# Patient Record
Sex: MALE | Race: WHITE | Employment: OTHER | URBAN - NONMETROPOLITAN AREA
[De-identification: names, ages, dates, MRNs, and addresses within clinical notes are randomized per-mention and may not be internally consistent; named-entity substitution may affect disease eponyms.]

---

## 2017-01-12 RX ORDER — AZITHROMYCIN 250 MG/1
TABLET, FILM COATED ORAL
Qty: 1 PACKET | Refills: 0 | OUTPATIENT
Start: 2017-01-12

## 2017-04-17 RX ORDER — VALSARTAN 160 MG/1
TABLET ORAL
Qty: 30 TABLET | Refills: 2 | Status: SHIPPED | OUTPATIENT
Start: 2017-04-17 | End: 2017-06-19 | Stop reason: ALTCHOICE

## 2017-06-19 ENCOUNTER — OFFICE VISIT (OUTPATIENT)
Dept: PRIMARY CARE CLINIC | Age: 67
End: 2017-06-19
Payer: MEDICARE

## 2017-06-19 VITALS
OXYGEN SATURATION: 97 % | SYSTOLIC BLOOD PRESSURE: 136 MMHG | HEIGHT: 66 IN | WEIGHT: 232 LBS | BODY MASS INDEX: 37.28 KG/M2 | RESPIRATION RATE: 22 BRPM | DIASTOLIC BLOOD PRESSURE: 68 MMHG | HEART RATE: 85 BPM

## 2017-06-19 DIAGNOSIS — I10 ESSENTIAL HYPERTENSION: Primary | ICD-10-CM

## 2017-06-19 DIAGNOSIS — I10 ESSENTIAL HYPERTENSION: ICD-10-CM

## 2017-06-19 LAB
ALBUMIN SERPL-MCNC: 4.6 G/DL (ref 3.5–5.2)
ALP BLD-CCNC: 51 U/L (ref 40–130)
ALT SERPL-CCNC: 17 U/L (ref 5–41)
ANION GAP SERPL CALCULATED.3IONS-SCNC: 19 MMOL/L (ref 7–19)
AST SERPL-CCNC: 21 U/L (ref 5–40)
BILIRUB SERPL-MCNC: 0.4 MG/DL (ref 0.2–1.2)
BILIRUBIN URINE: NEGATIVE
BLOOD, URINE: NEGATIVE
BUN BLDV-MCNC: 13 MG/DL (ref 8–23)
CALCIUM SERPL-MCNC: 9.6 MG/DL (ref 8.8–10.2)
CHLORIDE BLD-SCNC: 100 MMOL/L (ref 98–111)
CLARITY: CLEAR
CO2: 23 MMOL/L (ref 22–29)
COLOR: YELLOW
CREAT SERPL-MCNC: 0.8 MG/DL (ref 0.5–1.2)
CREATININE URINE: 39 MG/DL (ref 4.2–622)
GFR NON-AFRICAN AMERICAN: >60
GLUCOSE BLD-MCNC: 117 MG/DL (ref 74–109)
GLUCOSE URINE: NEGATIVE MG/DL
HBA1C MFR BLD: 5.7 %
HCT VFR BLD CALC: 46.5 % (ref 42–52)
HEMOGLOBIN: 16 G/DL (ref 14–18)
KETONES, URINE: NEGATIVE MG/DL
LEUKOCYTE ESTERASE, URINE: NEGATIVE
MCH RBC QN AUTO: 31 PG (ref 27–31)
MCHC RBC AUTO-ENTMCNC: 34.4 G/DL (ref 33–37)
MCV RBC AUTO: 90.1 FL (ref 80–94)
NITRITE, URINE: NEGATIVE
PDW BLD-RTO: 13 % (ref 11.5–14.5)
PH UA: 5
PLATELET # BLD: 220 K/UL (ref 130–400)
PMV BLD AUTO: 11.9 FL (ref 9.4–12.4)
POTASSIUM SERPL-SCNC: 3.9 MMOL/L (ref 3.5–5)
PROTEIN PROTEIN: 5 MG/DL (ref 15–45)
PROTEIN UA: NEGATIVE MG/DL
RBC # BLD: 5.16 M/UL (ref 4.7–6.1)
SODIUM BLD-SCNC: 142 MMOL/L (ref 136–145)
SPECIFIC GRAVITY UA: 1.01
TOTAL PROTEIN: 7.7 G/DL (ref 6.6–8.7)
TSH SERPL DL<=0.05 MIU/L-ACNC: 1.91 UIU/ML (ref 0.27–4.2)
UROBILINOGEN, URINE: 0.2 E.U./DL
WBC # BLD: 7.7 K/UL (ref 4.8–10.8)

## 2017-06-19 PROCEDURE — G8419 CALC BMI OUT NRM PARAM NOF/U: HCPCS | Performed by: INTERNAL MEDICINE

## 2017-06-19 PROCEDURE — 99214 OFFICE O/P EST MOD 30 MIN: CPT | Performed by: INTERNAL MEDICINE

## 2017-06-19 PROCEDURE — 3017F COLORECTAL CA SCREEN DOC REV: CPT | Performed by: INTERNAL MEDICINE

## 2017-06-19 PROCEDURE — 4040F PNEUMOC VAC/ADMIN/RCVD: CPT | Performed by: INTERNAL MEDICINE

## 2017-06-19 PROCEDURE — 1036F TOBACCO NON-USER: CPT | Performed by: INTERNAL MEDICINE

## 2017-06-19 PROCEDURE — G8427 DOCREV CUR MEDS BY ELIG CLIN: HCPCS | Performed by: INTERNAL MEDICINE

## 2017-06-19 PROCEDURE — 1123F ACP DISCUSS/DSCN MKR DOCD: CPT | Performed by: INTERNAL MEDICINE

## 2017-06-19 RX ORDER — VALSARTAN AND HYDROCHLOROTHIAZIDE 160; 25 MG/1; MG/1
1 TABLET ORAL DAILY
Qty: 30 TABLET | Refills: 3 | Status: SHIPPED | OUTPATIENT
Start: 2017-06-19 | End: 2018-01-16 | Stop reason: SDUPTHER

## 2017-06-19 RX ORDER — HYDROCHLOROTHIAZIDE 25 MG/1
25 TABLET ORAL DAILY
Qty: 90 TABLET | Refills: 3 | Status: CANCELLED | OUTPATIENT
Start: 2017-06-19

## 2017-06-19 ASSESSMENT — ENCOUNTER SYMPTOMS
DIARRHEA: 0
SHORTNESS OF BREATH: 0
VOMITING: 0
NAUSEA: 0
BACK PAIN: 0
CONSTIPATION: 0

## 2018-01-16 RX ORDER — VALSARTAN AND HYDROCHLOROTHIAZIDE 160; 25 MG/1; MG/1
1 TABLET ORAL DAILY
Qty: 30 TABLET | Refills: 5 | Status: SHIPPED | OUTPATIENT
Start: 2018-01-16 | End: 2018-09-21

## 2018-01-16 NOTE — TELEPHONE ENCOUNTER
From: Vikram Almaraz. Sent: 1/16/2018 11:25 AM CST  Subject: Medication Renewal Request    Matthew Alexis. would like a refill of the following medications:  valsartan-hydrochlorothiazide (DIOVAN-HCT) 160-25 MG per tablet Ranchela Croft, DO]    Preferred pharmacy: [facility] [address], [address] [place] [address]    Comment:  Greetings from [facility]. ..current temp 6. ..yelp 6... Viji Timmons at [facility] has informed me I do not have anymore refills on this script. Please renew this script. Mana Ambrosio Mana Ambrosio I have enough for (9) nine days. Thank you! Romayne Ensign.

## 2018-01-22 ENCOUNTER — OFFICE VISIT (OUTPATIENT)
Dept: PRIMARY CARE CLINIC | Age: 68
End: 2018-01-22
Payer: MEDICARE

## 2018-01-22 VITALS
HEART RATE: 89 BPM | DIASTOLIC BLOOD PRESSURE: 70 MMHG | BODY MASS INDEX: 37.48 KG/M2 | HEIGHT: 66 IN | WEIGHT: 233.2 LBS | SYSTOLIC BLOOD PRESSURE: 136 MMHG | TEMPERATURE: 97 F | OXYGEN SATURATION: 97 %

## 2018-01-22 DIAGNOSIS — J01.10 ACUTE NON-RECURRENT FRONTAL SINUSITIS: Primary | ICD-10-CM

## 2018-01-22 PROCEDURE — G8484 FLU IMMUNIZE NO ADMIN: HCPCS | Performed by: INTERNAL MEDICINE

## 2018-01-22 PROCEDURE — G8419 CALC BMI OUT NRM PARAM NOF/U: HCPCS | Performed by: INTERNAL MEDICINE

## 2018-01-22 PROCEDURE — 99213 OFFICE O/P EST LOW 20 MIN: CPT | Performed by: INTERNAL MEDICINE

## 2018-01-22 PROCEDURE — 96372 THER/PROPH/DIAG INJ SC/IM: CPT | Performed by: INTERNAL MEDICINE

## 2018-01-22 PROCEDURE — 1123F ACP DISCUSS/DSCN MKR DOCD: CPT | Performed by: INTERNAL MEDICINE

## 2018-01-22 PROCEDURE — 4040F PNEUMOC VAC/ADMIN/RCVD: CPT | Performed by: INTERNAL MEDICINE

## 2018-01-22 PROCEDURE — 3017F COLORECTAL CA SCREEN DOC REV: CPT | Performed by: INTERNAL MEDICINE

## 2018-01-22 PROCEDURE — 1036F TOBACCO NON-USER: CPT | Performed by: INTERNAL MEDICINE

## 2018-01-22 PROCEDURE — G8427 DOCREV CUR MEDS BY ELIG CLIN: HCPCS | Performed by: INTERNAL MEDICINE

## 2018-01-22 RX ORDER — AZITHROMYCIN 250 MG/1
TABLET, FILM COATED ORAL
Qty: 1 PACKET | Refills: 0 | Status: SHIPPED | OUTPATIENT
Start: 2018-01-22 | End: 2018-02-01

## 2018-01-22 RX ORDER — KETOROLAC TROMETHAMINE 30 MG/ML
30 INJECTION, SOLUTION INTRAMUSCULAR; INTRAVENOUS ONCE
Qty: 1 ML | Refills: 0
Start: 2018-01-22 | End: 2020-09-16 | Stop reason: ALTCHOICE

## 2018-01-22 RX ORDER — TRIAMCINOLONE ACETONIDE 40 MG/ML
40 INJECTION, SUSPENSION INTRA-ARTICULAR; INTRAMUSCULAR ONCE
Status: COMPLETED | OUTPATIENT
Start: 2018-01-22 | End: 2018-01-22

## 2018-01-22 RX ORDER — MONTELUKAST SODIUM 10 MG/1
10 TABLET ORAL NIGHTLY
Qty: 15 TABLET | Refills: 0 | Status: SHIPPED | OUTPATIENT
Start: 2018-01-22 | End: 2018-02-15 | Stop reason: SDUPTHER

## 2018-01-22 RX ADMIN — TRIAMCINOLONE ACETONIDE 40 MG: 40 INJECTION, SUSPENSION INTRA-ARTICULAR; INTRAMUSCULAR at 11:31

## 2018-01-22 ASSESSMENT — ENCOUNTER SYMPTOMS
NAUSEA: 0
SINUS PAIN: 1
VOMITING: 0
CONSTIPATION: 0
RHINORRHEA: 1
DIARRHEA: 0
SHORTNESS OF BREATH: 0
BACK PAIN: 0
SINUS PRESSURE: 1

## 2018-01-22 NOTE — PROGRESS NOTES
After obtaining consent, and per orders of Dr. Niko Rodriguez, injection of Kenalog and Toradol given in Right and Left upper quad. gluteus by Karley Watkins. Patient tolerated the injection well.

## 2018-01-31 ENCOUNTER — TELEPHONE (OUTPATIENT)
Dept: PRIMARY CARE CLINIC | Age: 68
End: 2018-01-31

## 2018-02-15 RX ORDER — MONTELUKAST SODIUM 10 MG/1
10 TABLET ORAL NIGHTLY
Qty: 15 TABLET | Refills: 0 | Status: SHIPPED | OUTPATIENT
Start: 2018-02-15 | End: 2018-04-16 | Stop reason: SDUPTHER

## 2018-02-22 RX ORDER — AZITHROMYCIN 250 MG/1
TABLET, FILM COATED ORAL
Qty: 1 PACKET | Refills: 0 | Status: SHIPPED | OUTPATIENT
Start: 2018-02-22 | End: 2018-11-19 | Stop reason: SDUPTHER

## 2018-04-16 ENCOUNTER — TELEPHONE (OUTPATIENT)
Dept: PRIMARY CARE CLINIC | Age: 68
End: 2018-04-16

## 2018-04-16 RX ORDER — MONTELUKAST SODIUM 10 MG/1
10 TABLET ORAL NIGHTLY
Qty: 15 TABLET | Refills: 0 | Status: SHIPPED | OUTPATIENT
Start: 2018-04-16 | End: 2020-09-16 | Stop reason: ALTCHOICE

## 2018-08-13 ENCOUNTER — OFFICE VISIT (OUTPATIENT)
Dept: PRIMARY CARE CLINIC | Age: 68
End: 2018-08-13
Payer: MEDICARE

## 2018-08-13 VITALS
BODY MASS INDEX: 37.61 KG/M2 | HEART RATE: 82 BPM | WEIGHT: 234 LBS | HEIGHT: 66 IN | OXYGEN SATURATION: 100 % | SYSTOLIC BLOOD PRESSURE: 132 MMHG | DIASTOLIC BLOOD PRESSURE: 60 MMHG | TEMPERATURE: 98.6 F

## 2018-08-13 DIAGNOSIS — Z13.1 SCREENING FOR DIABETES MELLITUS (DM): ICD-10-CM

## 2018-08-13 DIAGNOSIS — Z23 NEED FOR VACCINE FOR DT (DIPHTHERIA-TETANUS): ICD-10-CM

## 2018-08-13 DIAGNOSIS — Z13.220 SCREENING FOR CHOLESTEROL LEVEL: ICD-10-CM

## 2018-08-13 DIAGNOSIS — Z00.00 ROUTINE GENERAL MEDICAL EXAMINATION AT A HEALTH CARE FACILITY: Primary | ICD-10-CM

## 2018-08-13 DIAGNOSIS — J30.2 SEASONAL ALLERGIES: ICD-10-CM

## 2018-08-13 LAB — HBA1C MFR BLD: 5.8 %

## 2018-08-13 PROCEDURE — 4040F PNEUMOC VAC/ADMIN/RCVD: CPT | Performed by: NURSE PRACTITIONER

## 2018-08-13 PROCEDURE — G0438 PPPS, INITIAL VISIT: HCPCS | Performed by: NURSE PRACTITIONER

## 2018-08-13 PROCEDURE — 90715 TDAP VACCINE 7 YRS/> IM: CPT | Performed by: NURSE PRACTITIONER

## 2018-08-13 PROCEDURE — 90471 IMMUNIZATION ADMIN: CPT | Performed by: NURSE PRACTITIONER

## 2018-08-13 ASSESSMENT — PATIENT HEALTH QUESTIONNAIRE - PHQ9
SUM OF ALL RESPONSES TO PHQ QUESTIONS 1-9: 0
SUM OF ALL RESPONSES TO PHQ QUESTIONS 1-9: 0

## 2018-08-13 ASSESSMENT — ANXIETY QUESTIONNAIRES: GAD7 TOTAL SCORE: 0

## 2018-08-13 ASSESSMENT — LIFESTYLE VARIABLES
HOW MANY STANDARD DRINKS CONTAINING ALCOHOL DO YOU HAVE ON A TYPICAL DAY: 0
HOW OFTEN DO YOU HAVE A DRINK CONTAINING ALCOHOL: 4
HOW OFTEN DURING THE LAST YEAR HAVE YOU NEEDED AN ALCOHOLIC DRINK FIRST THING IN THE MORNING TO GET YOURSELF GOING AFTER A NIGHT OF HEAVY DRINKING: 0
AUDIT-C TOTAL SCORE: 4
HOW OFTEN DURING THE LAST YEAR HAVE YOU HAD A FEELING OF GUILT OR REMORSE AFTER DRINKING: 0
HOW OFTEN DURING THE LAST YEAR HAVE YOU FAILED TO DO WHAT WAS NORMALLY EXPECTED FROM YOU BECAUSE OF DRINKING: 0
HOW OFTEN DO YOU HAVE SIX OR MORE DRINKS ON ONE OCCASION: 0
HAVE YOU OR SOMEONE ELSE BEEN INJURED AS A RESULT OF YOUR DRINKING: 0
HOW OFTEN DURING THE LAST YEAR HAVE YOU BEEN UNABLE TO REMEMBER WHAT HAPPENED THE NIGHT BEFORE BECAUSE YOU HAD BEEN DRINKING: 0
HAS A RELATIVE, FRIEND, DOCTOR, OR ANOTHER HEALTH PROFESSIONAL EXPRESSED CONCERN ABOUT YOUR DRINKING OR SUGGESTED YOU CUT DOWN: 0
AUDIT TOTAL SCORE: 4
HOW OFTEN DURING THE LAST YEAR HAVE YOU FOUND THAT YOU WERE NOT ABLE TO STOP DRINKING ONCE YOU HAD STARTED: 0

## 2018-08-13 NOTE — PROGRESS NOTES
Medicare Annual Wellness Visit  Name: Particio CREWS Date: 2018   MRN: [id number] Sex: Male   Age: 80 y.o. Ethnicity: Non-/Non    : 1950 Race: Alfredo Chun. is here for No chief complaint on file. Screenings for behavioral, psychosocial and functional/safety risks, and cognitive dysfunction are all negative except as indicated below. These results, as well as other patient data from the [address] form, are documented in Flowsheets linked to this Encounter. Allergies   Allergen Reactions    Levofloxacin     Pcn [Penicillins]     Sulfa Antibiotics        Prior to Visit Medications    Medication Sig Taking?  Authorizing Provider   montelukast (SINGULAIR) 10 MG tablet Take 1 tablet by mouth nightly for 15 days  Troy Coulter,    ketorolac (TORADOL) 30 MG/ML injection Inject 1 mL into the muscle once for 1 dose  Troy Coulter DO   valsartan-hydrochlorothiazide (DIOVAN-HCT) 160-25 MG per tablet Take 1 tablet by mouth daily  Troy Coulter DO   Ascorbic Acid (VITAMIN C) 500 MG tablet Take 500 mg by mouth daily  Historical Provider, MD   aspirin 325 MG tablet Take 325 mg by mouth daily  Historical Provider, MD   Cholecalciferol (VITAMIN D PO) Take by mouth  Historical Provider, MD   vitamin E 1000 UNITS capsule Take 1,000 Units by mouth daily  Historical Provider, MD       Past Medical History:   Diagnosis Date    Atrial fibrillation ([address]     Diverticulitis     HTN (hypertension)     Unspecified sleep apnea     CPAP    Wears glasses      Past Surgical History:   Procedure Laterality Date    COLON SURGERY      perf-Dr QUEZADA    OTHER SURGICAL HISTORY  13    heart mapping & electrophysiologic ablation    OTHER SURGICAL HISTORY  5/11/15    colostomy takedown       Family History   Problem Relation Age of Onset    Heart Failure Father     Cancer Father         colon    Heart Disease Father         CHF    Diabetes

## 2018-08-13 NOTE — PROGRESS NOTES
deficit, gait, coordination and speech normal    Patient's complete Health Risk Assessment and screening values have been reviewed and are found in Flowsheets. The following problems were reviewed today and where indicated follow up appointments were made and/or referrals ordered. Positive Risk Factor Screenings with Interventions:     General Health:  General  In general, how would you say your health is?: Excellent  In the past 7 days, have you experienced any of the following?: None of These  Do you get the social and emotional support that you need?: Yes  Do you have a Living Will?: (!) No  General Health Risk Interventions:  · None indicated    Health Habits/Nutrition:  Health Habits/Nutrition  Do you exercise for at least 20 minutes 2-3 times per week?: Yes  Have you lost any weight without trying in the past 3 months?: No  Do you eat fewer than 2 meals per day?: No  Have you seen a dentist within the past year?: Yes  Body mass index is 37.77 kg/m². Health Habits/Nutrition Interventions:  · None indicated    Safety:  Safety  Do you have working smoke detectors?: Yes  Have all throw rugs been removed or fastened?: Yes  Do you have non-slip mats in all bathtubs?: (!) No  Do all of your stairways have a railing or banister?: Yes  Are your doorways, halls and stairs free of clutter?: Yes  Do you always fasten your seatbelt when you are in a car?: (!) No  Safety Interventions:  · None indicated    Personalized Preventive Plan   Current Health Maintenance Status    There is no immunization history on file for this patient.      Health Maintenance   Topic Date Due    AAA screen  1950    Hepatitis C screen  1950    DTaP/Tdap/Td vaccine (1 - Tdap) 07/31/1970    Lipid screen  07/31/1991    Shingles Vaccine (1 of 2 - 2 Dose Series) 07/31/2001    Colon cancer screen colonoscopy  07/31/2001    Pneumococcal low/med risk (1 of 2 - PCV13) 07/31/2016    A1C test (Diabetic or Prediabetic)  06/19/2018    Potassium monitoring  06/19/2018    Creatinine monitoring  06/19/2018    Flu vaccine (1) 09/01/2018     Recommendations for Preventive Services Due: see orders and patient instructions/AVS.  .   Recommended screening schedule for the next 5-10 years is provided to the patient in written form: see Patient Instructions/AVS.

## 2018-08-17 ASSESSMENT — PATIENT HEALTH QUESTIONNAIRE - PHQ9: SUM OF ALL RESPONSES TO PHQ QUESTIONS 1-9: 0

## 2018-08-27 ASSESSMENT — PATIENT HEALTH QUESTIONNAIRE - PHQ9: SUM OF ALL RESPONSES TO PHQ QUESTIONS 1-9: 0

## 2018-09-21 RX ORDER — LOSARTAN POTASSIUM AND HYDROCHLOROTHIAZIDE 25; 100 MG/1; MG/1
1 TABLET ORAL DAILY
Qty: 30 TABLET | Refills: 3 | Status: SHIPPED | OUTPATIENT
Start: 2018-09-21 | End: 2019-09-03 | Stop reason: SDUPTHER

## 2018-11-19 RX ORDER — AZITHROMYCIN 250 MG/1
TABLET, FILM COATED ORAL
Qty: 1 PACKET | Refills: 0 | Status: SHIPPED | OUTPATIENT
Start: 2018-11-19 | End: 2018-11-29

## 2018-11-19 NOTE — TELEPHONE ENCOUNTER
Patient called,request refill zpac \"it's my twice a year bronchitis-unable to come in for appt.  escribed

## 2019-01-15 ENCOUNTER — TELEPHONE (OUTPATIENT)
Dept: PRIMARY CARE CLINIC | Age: 69
End: 2019-01-15

## 2019-01-16 RX ORDER — AZITHROMYCIN 250 MG/1
TABLET, FILM COATED ORAL
Qty: 1 PACKET | Refills: 0 | Status: SHIPPED | OUTPATIENT
Start: 2019-01-16 | End: 2019-01-26

## 2019-09-04 ENCOUNTER — TELEPHONE (OUTPATIENT)
Dept: PRIMARY CARE CLINIC | Age: 69
End: 2019-09-04

## 2019-09-04 DIAGNOSIS — I10 HYPERTENSION, UNSPECIFIED TYPE: Primary | ICD-10-CM

## 2019-09-04 RX ORDER — LOSARTAN POTASSIUM AND HYDROCHLOROTHIAZIDE 25; 100 MG/1; MG/1
1 TABLET ORAL DAILY
Qty: 90 TABLET | Refills: 5 | Status: SHIPPED | OUTPATIENT
Start: 2019-09-04 | End: 2020-11-30

## 2019-09-04 NOTE — TELEPHONE ENCOUNTER
Pt would like his Losartan to be 90 prescription rather than 30 due to it being cheaper. He goes to [facility].

## 2020-09-16 ENCOUNTER — OFFICE VISIT (OUTPATIENT)
Dept: PRIMARY CARE CLINIC | Age: 70
End: 2020-09-16
Payer: MEDICARE

## 2020-09-16 VITALS
OXYGEN SATURATION: 98 % | DIASTOLIC BLOOD PRESSURE: 92 MMHG | TEMPERATURE: 97.8 F | HEIGHT: 66 IN | SYSTOLIC BLOOD PRESSURE: 160 MMHG | HEART RATE: 87 BPM | WEIGHT: 241 LBS | BODY MASS INDEX: 38.73 KG/M2

## 2020-09-16 LAB
APPEARANCE FLUID: CLEAR
BILIRUBIN, POC: NORMAL
BLOOD URINE, POC: NORMAL
CLARITY, POC: CLEAR
COLOR, POC: YELLOW
GLUCOSE URINE, POC: NORMAL
KETONES, POC: NORMAL
LEUKOCYTE EST, POC: NORMAL
NITRITE, POC: NORMAL
PH, POC: 5
PROTEIN, POC: NORMAL
SPECIFIC GRAVITY, POC: 1.03
UROBILINOGEN, POC: 0.2

## 2020-09-16 PROCEDURE — G8417 CALC BMI ABV UP PARAM F/U: HCPCS | Performed by: NURSE PRACTITIONER

## 2020-09-16 PROCEDURE — 4004F PT TOBACCO SCREEN RCVD TLK: CPT | Performed by: NURSE PRACTITIONER

## 2020-09-16 PROCEDURE — 99214 OFFICE O/P EST MOD 30 MIN: CPT | Performed by: NURSE PRACTITIONER

## 2020-09-16 PROCEDURE — 3017F COLORECTAL CA SCREEN DOC REV: CPT | Performed by: NURSE PRACTITIONER

## 2020-09-16 PROCEDURE — 4040F PNEUMOC VAC/ADMIN/RCVD: CPT | Performed by: NURSE PRACTITIONER

## 2020-09-16 PROCEDURE — G8427 DOCREV CUR MEDS BY ELIG CLIN: HCPCS | Performed by: NURSE PRACTITIONER

## 2020-09-16 PROCEDURE — 1123F ACP DISCUSS/DSCN MKR DOCD: CPT | Performed by: NURSE PRACTITIONER

## 2020-09-16 PROCEDURE — 81002 URINALYSIS NONAUTO W/O SCOPE: CPT | Performed by: NURSE PRACTITIONER

## 2020-09-16 ASSESSMENT — PATIENT HEALTH QUESTIONNAIRE - PHQ9
SUM OF ALL RESPONSES TO PHQ QUESTIONS 1-9: 0
SUM OF ALL RESPONSES TO PHQ9 QUESTIONS 1 & 2: 0
2. FEELING DOWN, DEPRESSED OR HOPELESS: 0
SUM OF ALL RESPONSES TO PHQ QUESTIONS 1-9: 0
1. LITTLE INTEREST OR PLEASURE IN DOING THINGS: 0

## 2020-09-16 ASSESSMENT — ENCOUNTER SYMPTOMS
COLOR CHANGE: 0
VOICE CHANGE: 0
SHORTNESS OF BREATH: 0
PHOTOPHOBIA: 0
BACK PAIN: 0
COUGH: 0
NAUSEA: 0
RHINORRHEA: 0
VOMITING: 0

## 2020-09-16 NOTE — PROGRESS NOTES
[address] [facility]  [id number] [place] [address]  [address] [address]  Dept: [phone]  Dept Fax: [phone]  Loc: [phone]    Major Connolly is a 72 y.o. male who presents today for his medical conditions/complaints as noted below. Major Connolly is c/o of Other (patient complaints of brown semen)      HPI:     HPI   Chief Complaint   Patient presents with    Other     patient complaints of brown semen     Patient presents today for evaluation of brown semen. Patient states that recently he and his wife have noticed this and it is concerning. He denies pain with urination, frequency or pain with intercourse or ejaculation. He does report a fullness feeling in his pelvic region; he attributes this to \"gas\" he states. He states his brother had prostate cancer.      Past Medical History:   Diagnosis Date    Atrial fibrillation ([address]     Diverticulitis     HTN (hypertension)     Unspecified sleep apnea     CPAP    Wears glasses       Past Surgical History:   Procedure Laterality Date    COLON SURGERY  2014    [facility]-Dr QUEZADA    OTHER SURGICAL HISTORY  5/27/13    heart mapping & electrophysiologic ablation    OTHER SURGICAL HISTORY  5/11/15    colostomy takedown    SUBTOTAL COLECTOMY         Vitals 9/16/2020 8/13/2018 1/22/2018 6/19/2017 5/10/2016 3/96/2198   SYSTOLIC 111 002 142 260 373 696   DIASTOLIC 92 60 70 68 70 78   Site - - Right Arm - - -   Position - - Sitting - - -   Pulse 87 82 89 85 80 83   Temp 97.8 98.6 97 - 97.8 98   Resp - - - 22 - -   SpO2 98 100 97 97 - -   Weight 241 lb 234 lb 233 lb 3.2 oz 232 lb 233 lb 9.6 oz 231 lb 6.4 oz   Height 5' 6\" 5' 6\" 5' 6\" 5' 6\" 5' 6\" 5' 6\"   BMI (wt*703/ht~2) 38.89 kg/m2 37.76 kg/m2 37.64 kg/m2 37.44 kg/m2 37.7 kg/m2 37.34 kg/m2       Family History   Problem Relation Age of Onset    Heart Failure Father     Cancer Father         colon    Heart Disease Father         CHF    Diabetes Mother     Heart Disease Maternal Grandmother     Heart Disease Maternal Grandfather        Social History     Tobacco Use    Smoking status: Former Smoker     Packs/day: 0.25     Years: 5.00     Pack years: 1.25     Start date:      Last attempt to quit:      Years since quittin.7    Smokeless tobacco: Never Used    Tobacco comment: Self Employed    Substance Use Topics    Alcohol use: Yes     Alcohol/week: 1.0 standard drinks     Types: 1 Glasses of wine per week     Comment: glass of Merlot per day      Current Outpatient Medications   Medication Sig Dispense Refill    losartan-hydrochlorothiazide (HYZAAR) 100-25 MG per tablet Take 1 tablet by mouth daily 90 tablet 5    Ascorbic Acid (VITAMIN C) 500 MG tablet Take 500 mg by mouth daily      aspirin 325 MG tablet Take 325 mg by mouth daily      Cholecalciferol (VITAMIN D PO) Take by mouth      vitamin E 1000 UNITS capsule Take 1,000 Units by mouth daily       No current facility-administered medications for this visit. Allergies   Allergen Reactions    Levofloxacin     Pcn [Penicillins]     Sulfa Antibiotics        Health Maintenance   Topic Date Due    AAA screen  1950    Hepatitis C screen  1950    Lipid screen  1991    Shingles Vaccine (1 of 2) 2001    Colon cancer screen colonoscopy  2001    Pneumococcal 65+ years Vaccine (1 of 1 - PPSV23) 2016    Potassium monitoring  2018    Creatinine monitoring  2018    Annual Wellness Visit (AWV)  2019    A1C test (Diabetic or Prediabetic)  2019    Flu vaccine (1) 2020    DTaP/Tdap/Td vaccine (2 - Td) 2028    Hepatitis A vaccine  Aged Out    Hepatitis B vaccine  Aged Out    Hib vaccine  Aged Out    Meningococcal (ACWY) vaccine  Aged Out       Subjective:      Review of Systems   Constitutional: Negative for chills and fever. HENT: Negative for ear pain, hearing loss, rhinorrhea and voice change.     Eyes: Negative for photophobia and visual disturbance. Respiratory: Negative for cough and shortness of breath. Cardiovascular: Negative for chest pain and palpitations. Gastrointestinal: Negative for nausea and vomiting. Endocrine: Negative. Negative for cold intolerance and heat intolerance. Genitourinary: Negative for difficulty urinating and flank pain. Musculoskeletal: Negative for back pain and neck pain. Skin: Negative for color change and rash. Allergic/Immunologic: Negative for environmental allergies and food allergies. Neurological: Negative for dizziness, speech difficulty and headaches. Hematological: Does not bruise/bleed easily. Psychiatric/Behavioral: Negative for sleep disturbance and suicidal ideas. Objective:     Physical Exam  Vitals signs and nursing note reviewed. Constitutional:       Appearance: He is well-developed. HENT:      Head: Atraumatic. Right Ear: External ear normal.      Left Ear: External ear normal.      Nose: Nose normal.   Eyes:      Conjunctiva/sclera: Conjunctivae normal.      Pupils: Pupils are equal, round, and reactive to light. Neck:      Musculoskeletal: Normal range of motion and neck supple. Cardiovascular:      Rate and Rhythm: Normal rate and regular rhythm. Heart sounds: Normal heart sounds, S1 normal and S2 normal.   Pulmonary:      Effort: Pulmonary effort is normal.      Breath sounds: Normal breath sounds. Abdominal:      General: Bowel sounds are normal.      Palpations: Abdomen is soft. Musculoskeletal: Normal range of motion. Skin:     General: Skin is warm and dry. Neurological:      Mental Status: He is alert and oriented to person, place, and time. Psychiatric:         Behavior: Behavior normal.       BP (!) 160/92   Pulse 87   Temp 97.8 °F (36.6 °C) (Temporal)   Ht 5' 6\" (1.676 m)   Wt 241 lb (109.3 kg)   SpO2 98%   BMI 38.90 kg/m²     Assessment:       Diagnosis Orders   1.  Hematospermia  POCT Urinalysis no Micro    US SCROTUM AND TESTICLES    CBC Auto Differential    Comprehensive Metabolic Panel    PSA, Diagnostic         Plan:     More than 50% of the time was spent counseling and coordinating care for a total time of 30 min face to face. Will obtain labs and u/s scrotum & testicles. We will likely need to refer to urology for further evaluation. Patient given educational materials -see patient instructions. Discussed use, benefit, and side effects of prescribed medications. All patient questions answered. Pt voiced understanding. Reviewed health maintenance. Instructed to continue currentmedications, diet and exercise. Patient agreed with treatment plan. Follow up as directed. MEDICATIONS:  No orders of the defined types were placed in this encounter. ORDERS:  Orders Placed This Encounter   Procedures    US SCROTUM AND TESTICLES    CBC Auto Differential    Comprehensive Metabolic Panel    PSA, Diagnostic    POCT Urinalysis no Micro       Follow-up:  No follow-ups on file. PATIENT INSTRUCTIONS:  Patient Instructions   We are committed to providing you with the best care possible. In order to help us achieve these goals please remember to bring all medications, herbal products, and over the counter supplements with you to each visit. If your provider has ordered testing for you, please be sure to follow up with our office if you have not received results within 7 days after the testing took place. *If you receive a survey after visiting one of our offices, please take time to share your experience concerning your physician office visit. These surveys are confidential and no health information about you is shared. We are eager to improve for you and we are counting on your feedback to help make that happen.       Electronically signed by NINA Zuniga on 9/16/2020 at 3:43 PM    EMR Dragon/transcription disclaimer:  Much of thisencounter note is electronic transcription/translation of spoken language to printed texts. The electronic translation of spoken language may be erroneous, or at times, nonsensical words or phrases may be inadvertentlytranscribed.   Although I have reviewed the note for such errors, some may still exist.

## 2020-09-21 ENCOUNTER — HOSPITAL ENCOUNTER (OUTPATIENT)
Dept: ULTRASOUND IMAGING | Age: 70
Discharge: HOME | End: 2020-09-21
Payer: MEDICARE

## 2020-09-21 PROCEDURE — 76870 US EXAM SCROTUM: CPT

## 2020-09-25 ENCOUNTER — TELEPHONE (OUTPATIENT)
Dept: PRIMARY CARE CLINIC | Age: 70
End: 2020-09-25

## 2020-09-25 NOTE — TELEPHONE ENCOUNTER
Called patient and left message for him to call at his convenience regarding ultrasound and diatherix results

## 2020-09-25 NOTE — TELEPHONE ENCOUNTER
----- Message from NINA Waller sent at 9/22/2020  8:54 AM CDT -----  Abnormal scrotal ultrasound; refer to urology. Did he have his labs and diatherix done? I do not see results on those yet. Thanks!

## 2020-09-28 ENCOUNTER — OFFICE VISIT (OUTPATIENT)
Dept: PRIMARY CARE CLINIC | Age: 70
End: 2020-09-28
Payer: MEDICARE

## 2020-09-28 PROCEDURE — G0008 ADMIN INFLUENZA VIRUS VAC: HCPCS | Performed by: FAMILY MEDICINE

## 2020-09-28 PROCEDURE — 90732 PPSV23 VACC 2 YRS+ SUBQ/IM: CPT | Performed by: FAMILY MEDICINE

## 2020-09-28 PROCEDURE — G0009 ADMIN PNEUMOCOCCAL VACCINE: HCPCS | Performed by: FAMILY MEDICINE

## 2020-09-28 PROCEDURE — 90694 VACC AIIV4 NO PRSRV 0.5ML IM: CPT | Performed by: FAMILY MEDICINE

## 2020-09-28 RX ORDER — ZOSTER VACCINE RECOMBINANT, ADJUVANTED 50 MCG/0.5
0.5 KIT INTRAMUSCULAR SEE ADMIN INSTRUCTIONS
Qty: 0.5 ML | Refills: 0 | Status: SHIPPED | OUTPATIENT
Start: 2020-09-28 | End: 2021-03-27

## 2020-09-30 ENCOUNTER — TELEPHONE (OUTPATIENT)
Dept: PRIMARY CARE CLINIC | Age: 70
End: 2020-09-30

## 2020-09-30 NOTE — TELEPHONE ENCOUNTER
----- Message from NINA Sauceda sent at 9/28/2020 11:58 PM CDT -----  PSA elevated; needs urology appointment; referral was placed on 9/25. I would like him seen soon as he is having concerning symptoms, had abnormal ultrasound.

## 2020-10-01 ENCOUNTER — TELEPHONE (OUTPATIENT)
Dept: PRIMARY CARE CLINIC | Age: 70
End: 2020-10-01

## 2020-10-01 NOTE — TELEPHONE ENCOUNTER
----- Message from NINA Duff sent at 9/28/2020 11:58 PM CDT -----  PSA elevated; needs urology appointment; referral was placed on 9/25. I would like him seen soon as he is having concerning symptoms, had abnormal ultrasound.

## 2020-10-26 ENCOUNTER — OFFICE VISIT (OUTPATIENT)
Dept: UROLOGY | Age: 70
End: 2020-10-26
Payer: MEDICARE

## 2020-10-26 VITALS — TEMPERATURE: 97.2 F | WEIGHT: 242 LBS | BODY MASS INDEX: 38.89 KG/M2 | HEIGHT: 66 IN

## 2020-10-26 PROCEDURE — 1036F TOBACCO NON-USER: CPT | Performed by: UROLOGY

## 2020-10-26 PROCEDURE — G8484 FLU IMMUNIZE NO ADMIN: HCPCS | Performed by: UROLOGY

## 2020-10-26 PROCEDURE — 99203 OFFICE O/P NEW LOW 30 MIN: CPT | Performed by: UROLOGY

## 2020-10-26 PROCEDURE — G8427 DOCREV CUR MEDS BY ELIG CLIN: HCPCS | Performed by: UROLOGY

## 2020-10-26 PROCEDURE — 4040F PNEUMOC VAC/ADMIN/RCVD: CPT | Performed by: UROLOGY

## 2020-10-26 PROCEDURE — G8417 CALC BMI ABV UP PARAM F/U: HCPCS | Performed by: UROLOGY

## 2020-10-26 PROCEDURE — 1123F ACP DISCUSS/DSCN MKR DOCD: CPT | Performed by: UROLOGY

## 2020-10-26 PROCEDURE — 3017F COLORECTAL CA SCREEN DOC REV: CPT | Performed by: UROLOGY

## 2020-10-26 ASSESSMENT — ENCOUNTER SYMPTOMS
RHINORRHEA: 0
FACIAL SWELLING: 0
ABDOMINAL PAIN: 0
NAUSEA: 0
COLOR CHANGE: 0
EYE DISCHARGE: 0
VOMITING: 0
SORE THROAT: 0
WHEEZING: 0
BACK PAIN: 0
COUGH: 0
SHORTNESS OF BREATH: 0
SINUS PRESSURE: 0
BLOOD IN STOOL: 0
DIARRHEA: 0
CONSTIPATION: 0
EYE PAIN: 0
ABDOMINAL DISTENTION: 0
EYE REDNESS: 0

## 2020-10-26 NOTE — PROGRESS NOTES
Lu Abdi is a 72 y.o. male who presents today   Chief Complaint   Patient presents with    New Patient     patient here for elevated PSA, bilateral hydrocele, and epidydimal cyst     Elevated PSA:  Patient is here today for an elevated PSA. Patient was having some symptoms of hematospermia he reported this to his PCP she ordered a PSA and a scrotal ultrasound. This showed elevated PSA and the ultrasound showed normal testis bilateral hydroceles and a 4 mm right epididymal cyst.  Patient is referred for evaluation. He denies any painful ejaculation he denies any testicular pain or testicular swelling  His last several PSA values are as follows:  Lab Results   Component Value Date    PSA 9.53 (H) 09/28/2020     Family History of prostate cancer? yes -brother  Recent history of urinary tract infection/prostatitis? yes -hematospermia  Recent catheterization? no  Recent acute urinary retention? no  Rx with Depotestosterone injections for male hypogonadism? no  Previous prostate biopsy? no  Lower urinary tract symptoms: frequency and nocturia, 1 times per night  No significant lower urinary tract symptoms.   He has some very mild septums his AUA symptom score today is 4    Past Medical History:   Diagnosis Date    Atrial fibrillation ([address]     Diverticulitis     HTN (hypertension)     Unspecified sleep apnea     CPAP    Wears glasses        Past Surgical History:   Procedure Laterality Date    COLON SURGERY  2014    perf-Dr Bales  OTHER SURGICAL HISTORY  5/27/13    heart mapping & electrophysiologic ablation    OTHER SURGICAL HISTORY  5/11/15    colostomy takedown    SUBTOTAL COLECTOMY         Current Outpatient Medications   Medication Sig Dispense Refill    zoster recombinant adjuvanted vaccine (SHINGRIX) 50 MCG/0.5ML SUSR injection Inject 0.5 mLs into the muscle See Admin Instructions 1 dose now and repeat in 2-6 months 0.5 mL 0    losartan-hydrochlorothiazide (HYZAAR) 100-25 MG per tablet Take 1 tablet by mouth daily 90 tablet 5    Ascorbic Acid (VITAMIN C) 500 MG tablet Take 500 mg by mouth daily      aspirin 325 MG tablet Take 325 mg by mouth daily      Cholecalciferol (VITAMIN D PO) Take by mouth      vitamin E 1000 UNITS capsule Take 1,000 Units by mouth daily       No current facility-administered medications for this visit. Allergies   Allergen Reactions    Levofloxacin     Pcn [Penicillins]     Sulfa Antibiotics        Social History     Socioeconomic History    Marital status:      Spouse name: None    Number of children: None    Years of education: None    Highest education level: None   Occupational History    None   Social Needs    Financial resource strain: None    Food insecurity     Worry: None     Inability: None    Transportation needs     Medical: None     Non-medical: None   Tobacco Use    Smoking status: Former Smoker     Packs/day: 0.25     Years: 5.00     Pack years: 1.25     Start date:      Last attempt to quit:      Years since quittin.8    Smokeless tobacco: Never Used    Tobacco comment: Self Employed    Substance and Sexual Activity    Alcohol use:  Yes     Alcohol/week: 1.0 standard drinks     Types: 1 Glasses of wine per week     Comment: glass of Merlot per day    Drug use: No    Sexual activity: None   Lifestyle    Physical activity     Days per week: None     Minutes per session: None    Stress: None   Relationships    Social connections     Talks on phone: None     Gets together: None     Attends Protestant service: None     Active member of club or organization: None     Attends meetings of clubs or organizations: None     Relationship status: None    Intimate partner violence     Fear of current or ex partner: None     Emotionally abused: None     Physically abused: None     Forced sexual activity: None   Other Topics Concern    None   Social History Narrative    None       Family History   Problem Relation Age of Onset  Heart Failure Father     Cancer Father         colon    Heart Disease Father         CHF    Diabetes Mother     Heart Disease Maternal Grandmother     Heart Disease Maternal Grandfather        REVIEW OF SYSTEMS:  Review of Systems   Constitutional: Negative for activity change, chills, fatigue and fever. HENT: Negative for congestion, ear discharge, ear pain, facial swelling, mouth sores, rhinorrhea, sinus pressure and sore throat. Eyes: Negative for pain, discharge and redness. Respiratory: Negative for cough, shortness of breath and wheezing. Cardiovascular: Negative for chest pain, palpitations and leg swelling. Gastrointestinal: Negative for abdominal distention, abdominal pain, blood in stool, constipation, diarrhea, nausea and vomiting. Endocrine: Negative for polydipsia, polyphagia and polyuria. Genitourinary: Negative for decreased urine volume, difficulty urinating, dysuria, enuresis, flank pain, frequency, genital sores, hematuria and urgency. Musculoskeletal: Negative for back pain, gait problem, joint swelling, neck pain and neck stiffness. Skin: Negative for color change, rash and wound. Allergic/Immunologic: Negative for environmental allergies and immunocompromised state. Neurological: Negative for dizziness, syncope, weakness, light-headedness, numbness and headaches. Hematological: Bruises/bleeds easily. Psychiatric/Behavioral: Negative for agitation, confusion, dysphoric mood, self-injury, sleep disturbance and suicidal ideas. The patient is not hyperactive. PHYSICAL EXAM:  Temp 97.2 °F (36.2 °C) (Temporal)   Ht 5' 6\" (1.676 m)   Wt 242 lb (109.8 kg)   BMI 39.06 kg/m²   Physical Exam  Vitals signs and nursing note reviewed. Constitutional:       General: He is not in acute distress. Appearance: Normal appearance. He is well-developed. HENT:      Head: Normocephalic and atraumatic.       Nose: Nose normal.   Eyes:      General: No scleral icterus. Conjunctiva/sclera: Conjunctivae normal.      Pupils: Pupils are equal, round, and reactive to light. Neck:      Musculoskeletal: Normal range of motion and neck supple. Trachea: No tracheal deviation. Cardiovascular:      Rate and Rhythm: Normal rate and regular rhythm. Pulmonary:      Effort: Pulmonary effort is normal. No respiratory distress. Breath sounds: Normal breath sounds. No stridor. Abdominal:      General: Bowel sounds are normal. There is no distension. Palpations: Abdomen is soft. There is no mass. Tenderness: There is no abdominal tenderness. Hernia: There is no hernia in the left inguinal area. Genitourinary:     Penis: Normal and circumcised. No phimosis or discharge. Scrotum/Testes: Normal.         Right: Mass, tenderness or swelling not present. Left: Mass, tenderness or swelling not present. Prostate: Normal. Not enlarged and not tender. Rectum: Normal.   Musculoskeletal: Normal range of motion. General: No tenderness. Lymphadenopathy:      Cervical: No cervical adenopathy. Skin:     General: Skin is warm and dry. Findings: No erythema. Neurological:      Mental Status: He is alert and oriented to person, place, and time.    Psychiatric:         Behavior: Behavior normal.         Judgment: Judgment normal.             DATA:  CBC:   Lab Results   Component Value Date    WBC 8.9 09/28/2020    RBC 5.35 09/28/2020    HGB 16.2 09/28/2020    HCT 47.6 09/28/2020    MCV 89.0 09/28/2020    MCH 30.3 09/28/2020    MCHC 34.0 09/28/2020    RDW 13.2 09/28/2020     09/28/2020    MPV 11.5 09/28/2020     CMP:    Lab Results   Component Value Date     09/28/2020    K 3.9 09/28/2020     09/28/2020    CO2 27 09/28/2020    BUN 18 09/28/2020    CREATININE 0.8 09/28/2020    GFRAA >59 09/28/2020    LABGLOM >60 09/28/2020    GLUCOSE 155 09/28/2020    PROT 7.1 09/28/2020    LABALBU 4.4 09/28/2020    CALCIUM 9.2 09/28/2020    BILITOT 0.4 09/28/2020    ALKPHOS 56 09/28/2020    AST 19 09/28/2020    ALT 17 09/28/2020     No results found for this visit on 10/26/20. Lab Results   Component Value Date    PSA 9.53 (H) 09/28/2020         IMAGING:  I reviewed the scrotal ultrasound done on 9/21/2020. This is not an abnormal study. He has normal testicles bilaterally. Schools are symmetrical and homogeneous with no masses. Normal Doppler flow bilaterally. There is a small incidental 4 mm right epididymal cyst.  He has some physiologic fluid in the tunica vaginalis space this is not pathologic and is expected. Certainly there is no masses or hydroceles on physical examination. 1. PSA elevation  With his symptoms of hematospermia I suspect this PSA elevation is due to inflammation of asked him to take an anti-inflammatory and we will repeat the PSA. There is nothing on examination or on the ultrasound but I find it pressing are concerning at this point.  - PSA, Diagnostic; Future      Orders Placed This Encounter   Procedures    PSA, Diagnostic     PSA prior to next visit     Standing Status:   Future     Standing Expiration Date:   10/26/2021        Return in about 2 weeks (around 11/9/2020) for PSA prior to vext visit. All information inputted into the note by the MA to include chief complaint, past medical history, past surgical history, medications, allergies, social and family history and review of systems has been reviewed and updated as needed by me. EMR Dragon/transcription disclaimer: Much of this documentt is electronic  transcription/translation of spoken language to printed text. The  electronic translation of spoken language may be erroneous, or at times,  nonsensical words or phrases may be inadvertently transcribed.  Although I  have reviewed the document for such errors, some may still exist.

## 2020-11-16 ENCOUNTER — OFFICE VISIT (OUTPATIENT)
Dept: UROLOGY | Age: 70
End: 2020-11-16
Payer: MEDICARE

## 2020-11-16 VITALS — BODY MASS INDEX: 38.57 KG/M2 | WEIGHT: 240 LBS | HEIGHT: 66 IN | TEMPERATURE: 98 F

## 2020-11-16 LAB
APPEARANCE FLUID: ABNORMAL
BILIRUBIN, POC: NEGATIVE
BLOOD URINE, POC: ABNORMAL
CLARITY, POC: CLEAR
COLOR, POC: YELLOW
GLUCOSE URINE, POC: NEGATIVE
KETONES, POC: NEGATIVE
LEUKOCYTE EST, POC: NEGATIVE
NITRITE, POC: NEGATIVE
PH, POC: 5
PROTEIN, POC: NEGATIVE
SPECIFIC GRAVITY, POC: 1.03
UROBILINOGEN, POC: 0.2

## 2020-11-16 PROCEDURE — 81002 URINALYSIS NONAUTO W/O SCOPE: CPT | Performed by: UROLOGY

## 2020-11-16 PROCEDURE — G8484 FLU IMMUNIZE NO ADMIN: HCPCS | Performed by: UROLOGY

## 2020-11-16 PROCEDURE — G8417 CALC BMI ABV UP PARAM F/U: HCPCS | Performed by: UROLOGY

## 2020-11-16 PROCEDURE — 1123F ACP DISCUSS/DSCN MKR DOCD: CPT | Performed by: UROLOGY

## 2020-11-16 PROCEDURE — 3017F COLORECTAL CA SCREEN DOC REV: CPT | Performed by: UROLOGY

## 2020-11-16 PROCEDURE — 1036F TOBACCO NON-USER: CPT | Performed by: UROLOGY

## 2020-11-16 PROCEDURE — G8427 DOCREV CUR MEDS BY ELIG CLIN: HCPCS | Performed by: UROLOGY

## 2020-11-16 PROCEDURE — 4040F PNEUMOC VAC/ADMIN/RCVD: CPT | Performed by: UROLOGY

## 2020-11-16 PROCEDURE — 99214 OFFICE O/P EST MOD 30 MIN: CPT | Performed by: UROLOGY

## 2020-11-16 RX ORDER — CEFDINIR 300 MG/1
300 CAPSULE ORAL 2 TIMES DAILY
Qty: 8 CAPSULE | Refills: 0 | Status: SHIPPED | OUTPATIENT
Start: 2020-11-16 | End: 2020-11-20

## 2020-11-16 ASSESSMENT — ENCOUNTER SYMPTOMS
EYE PAIN: 0
WHEEZING: 0
COUGH: 0
SORE THROAT: 0
NAUSEA: 0
VOMITING: 0
BACK PAIN: 0

## 2020-11-16 NOTE — PROGRESS NOTES
Dionna Garcia is a 72 y.o. male who presents today   Chief Complaint   Patient presents with    Follow-up     i am here for my follow up for elevated psa. Elevated PSA:  Patient is here today for history of an elevated PSA. His last several PSA values are as follows:  Lab Results   Component Value Date    PSA 10.98 (H) 11/09/2020    PSA 9.53 (H) 09/28/2020     Overall the PSA level is: increased  Recent history of urinary tract infection/prostatitis? no he was having some hematospermia. I did put him on over-the-counter nonsteroidal anti-inflammatory  Previous prostate biopsy? no  Lower urinary tract symptoms: frequency and nocturia, 1 times per night   He had a brother who had prostate cancer    Past Medical History:   Diagnosis Date    Atrial fibrillation (HCC)     Diverticulitis     HTN (hypertension)     Unspecified sleep apnea     CPAP    Wears glasses        Past Surgical History:   Procedure Laterality Date    COLON SURGERY  2014    perf-Dr Gavin Barrow OTHER SURGICAL HISTORY  5/27/13    heart mapping & electrophysiologic ablation    OTHER SURGICAL HISTORY  5/11/15    colostomy takedown    SUBTOTAL COLECTOMY         Current Outpatient Medications   Medication Sig Dispense Refill    ZINC PO Take 10 mg by mouth 2 times daily      losartan-hydrochlorothiazide (HYZAAR) 100-25 MG per tablet Take 1 tablet by mouth daily 90 tablet 5    Ascorbic Acid (VITAMIN C) 500 MG tablet Take 500 mg by mouth daily      aspirin 325 MG tablet Take 325 mg by mouth daily      Cholecalciferol (VITAMIN D PO) Take by mouth      vitamin E 1000 UNITS capsule Take 1,000 Units by mouth daily      zoster recombinant adjuvanted vaccine [facility]) 50 MCG/0.5ML SUSR injection Inject 0.5 mLs into the muscle See Admin Instructions 1 dose now and repeat in 2-6 months (Patient not taking: Reported on 11/16/2020) 0.5 mL 0     No current facility-administered medications for this visit.         Allergies   Allergen Reactions    Pcn [Penicillins]     Sulfa Antibiotics     Levofloxacin Other (See Comments)     \"childhood allergy\"       Social History     Socioeconomic History    Marital status:      Spouse name: None    Number of children: None    Years of education: None    Highest education level: None   Occupational History    None   Social Needs    Financial resource strain: None    Food insecurity     Worry: None     Inability: None    Transportation needs     Medical: None     Non-medical: None   Tobacco Use    Smoking status: Former Smoker     Packs/day: 0.25     Years: 5.00     Pack years: 1.25     Start date:      Last attempt to quit:      Years since quittin.8    Smokeless tobacco: Never Used    Tobacco comment: Self Employed    Substance and Sexual Activity    Alcohol use: Yes     Alcohol/week: 1.0 standard drinks     Types: 1 Glasses of wine per week     Comment: glass of Merlot per day    Drug use: No    Sexual activity: None   Lifestyle    Physical activity     Days per week: None     Minutes per session: None    Stress: None   Relationships    Social connections     Talks on phone: None     Gets together: None     Attends Holiness service: None     Active member of club or organization: None     Attends meetings of clubs or organizations: None     Relationship status: None    Intimate partner violence     Fear of current or ex partner: None     Emotionally abused: None     Physically abused: None     Forced sexual activity: None   Other Topics Concern    None   Social History Narrative    None       Family History   Problem Relation Age of Onset    Heart Failure Father     Cancer Father         colon    Heart Disease Father         CHF    Diabetes Mother     Heart Disease Maternal Grandmother     Heart Disease Maternal Grandfather        REVIEW OF SYSTEMS:  Review of Systems   Constitutional: Negative for chills and fever. HENT: Negative for congestion and sore throat. Cervical: No cervical adenopathy. Skin:     General: Skin is warm and dry. Findings: No erythema. Neurological:      Mental Status: He is alert and oriented to person, place, and time. Psychiatric:         Behavior: Behavior normal.         Judgment: Judgment normal.             DATA:  CMP:    Lab Results   Component Value Date     09/28/2020    K 3.9 09/28/2020     09/28/2020    CO2 27 09/28/2020    BUN 18 09/28/2020    CREATININE 0.8 09/28/2020    GFRAA >59 09/28/2020    LABGLOM >60 09/28/2020    GLUCOSE 155 09/28/2020    PROT 7.1 09/28/2020    LABALBU 4.4 09/28/2020    CALCIUM 9.2 09/28/2020    BILITOT 0.4 09/28/2020    ALKPHOS 56 09/28/2020    AST 19 09/28/2020    ALT 17 09/28/2020     Results for orders placed or performed in visit on 11/16/20   POCT Urinalysis no Micro   Result Value Ref Range    Color, UA YELLOW     Clarity, UA CLEAR     Glucose, UA POC NEGATIVE     Bilirubin, UA NEGATIVE     Ketones, UA NEGATIVE     Spec Grav, UA 1.030     Blood, UA POC TRACE     pH, UA 5.0     Protein, UA POC NEGATIVE     Urobilinogen, UA 0.2     Leukocytes, UA NEGATIVE     Nitrite, UA NEGATIVE     Appearance, Fluid Cloudy (A) Clear, Slightly Cloudy     Lab Results   Component Value Date    PSA 10.98 (H) 11/09/2020    PSA 9.53 (H) 09/28/2020     1. Elevated PSA  Plan for transrectal ultrasound biopsy of the prostate  - POCT Urinalysis no Micro  - cefdinir (OMNICEF) 300 MG capsule; Take 1 capsule by mouth 2 times daily for 4 days Begin taking 1 day prior to scheduled biopsy  Dispense: 8 capsule; Refill: 0      Orders Placed This Encounter   Procedures    POCT Urinalysis no Micro        Return for PT to be scheduled for TRUS Biopsy of prostate. All information inputted into the note by the MA to include chief complaint, past medical history, past surgical history, medications, allergies, social and family history and review of systems has been reviewed and updated as needed by me.     EMR Dragon/transcription disclaimer: Much of this documentt is electronic  transcription/translation of spoken language to printed text. The  electronic translation of spoken language may be erroneous, or at times,  nonsensical words or phrases may be inadvertently transcribed.  Although I  have reviewed the document for such errors, some may still exist.

## 2020-11-30 RX ORDER — LOSARTAN POTASSIUM AND HYDROCHLOROTHIAZIDE 25; 100 MG/1; MG/1
1 TABLET ORAL DAILY
Qty: 90 TABLET | Refills: 0 | Status: SHIPPED | OUTPATIENT
Start: 2020-11-30 | End: 2021-02-23

## 2020-12-10 ENCOUNTER — OFFICE VISIT (OUTPATIENT)
Age: 70
End: 2020-12-10

## 2020-12-10 VITALS — HEART RATE: 80 BPM | TEMPERATURE: 97.1 F | OXYGEN SATURATION: 98 %

## 2020-12-10 PROCEDURE — 99999 PR OFFICE/OUTPT VISIT,PROCEDURE ONLY: CPT | Performed by: NURSE PRACTITIONER

## 2020-12-11 LAB — SARS-COV-2, NAA: NOT DETECTED

## 2020-12-14 ENCOUNTER — PROCEDURE VISIT (OUTPATIENT)
Dept: UROLOGY | Age: 70
End: 2020-12-14
Payer: MEDICARE

## 2020-12-14 VITALS — BODY MASS INDEX: 38.57 KG/M2 | HEIGHT: 66 IN | WEIGHT: 240 LBS

## 2020-12-14 PROCEDURE — 76872 US TRANSRECTAL: CPT | Performed by: UROLOGY

## 2020-12-14 PROCEDURE — 96372 THER/PROPH/DIAG INJ SC/IM: CPT | Performed by: UROLOGY

## 2020-12-14 PROCEDURE — 55700 PR BIOPSY OF PROSTATE,NEEDLE/PUNCH: CPT | Performed by: UROLOGY

## 2020-12-14 RX ORDER — GENTAMICIN SULFATE 40 MG/ML
80 INJECTION, SOLUTION INTRAMUSCULAR; INTRAVENOUS ONCE
Status: COMPLETED | OUTPATIENT
Start: 2020-12-14 | End: 2020-12-14

## 2020-12-14 RX ADMIN — GENTAMICIN SULFATE 80 MG: 40 INJECTION, SOLUTION INTRAMUSCULAR; INTRAVENOUS at 08:09

## 2020-12-14 NOTE — LETTER
[facility]  [place] [address]  [address] [facility] [address]  Phone: [phone]  Fax: [phone]    Madeleine Escamilla MD        December 14, 6372     Feicher Johanna, APRN  [address]      Patient: Cindy Mccarthy   MR Number: [id number]   YOB: 1950   Date of Visit: 12/14/2020       Dear Provider: Thank you for referring Claudia Sandhu to me for evaluation. Below are the relevant portions of my assessment and plan of care. If you have questions, please do not hesitate to call me. I look forward to following Rhonda Norton along with you.     Sincerely,        Madeleine Escamilla MD

## 2020-12-14 NOTE — PROGRESS NOTES
w/ transrectal U/S done. Postop medications: Cipro 500 mg po bid for 2 more days. Recommendations: Will call patient with biopsy results and arrange for follow up. Postop Prostate Biopsy Instructions:  Patient told to drink plenty of fluids and to take it easy the day of the prostate biopsy, and the next few days. He was encouraged to use sitz baths as needed for relief of rectal discomfort after the biopsy. He was told he may notice blood or a rust color in his semen for several months after the biopsy. He was told to avoid resuming blood thinners or aspirin until the rectal bleeding or visible blood in urine has stopped for at least 48 hours. He should take his antibiotics to completion as prescribed. He was instructed to call our office immediately or to go to the Emergency Room if he experiences a fever over 101, shaking chills or an inability to urinate. Lab Results   Component Value Date    PSA 10.98 (H) 11/09/2020    PSA 9.53 (H) 09/28/2020               1. Elevated PSA  OCTAVIA benign 30 g smooth no nodularity prostate volume measured 37.9 no specific abnormality seen. We will contact patient with results  - Surgical Pathology  - WI ECHO,TRANSRECTAL  - WI Biopsy of prostate, needle/punch      Orders Placed This Encounter   Procedures    Surgical Pathology     Order Specific Question:   PREVIOUS BIOPSY     Answer:   No     Order Specific Question:   PREOP DIAGNOSIS     Answer:   r97.2     Order Specific Question:   FROZEN SECTION - NO OR YES/SPECIMEN     Answer:   No    WI ECHO,TRANSRECTAL    WI Biopsy of prostate, needle/punch        Return for [facility] call patient with results.

## 2020-12-18 ENCOUNTER — TELEPHONE (OUTPATIENT)
Dept: UROLOGY | Age: 70
End: 2020-12-18

## 2020-12-18 NOTE — TELEPHONE ENCOUNTER
Called patient with the path report.   He has Jeremías's  6 in 2 samples on the left side positive for prostate cancer he will be scheduled for cancer consult

## 2021-01-15 ENCOUNTER — OFFICE VISIT (OUTPATIENT)
Dept: UROLOGY | Age: 71
End: 2021-01-15
Payer: MEDICARE

## 2021-01-15 VITALS — HEIGHT: 66 IN | TEMPERATURE: 97.8 F | WEIGHT: 245 LBS | BODY MASS INDEX: 39.37 KG/M2

## 2021-01-15 DIAGNOSIS — C61 MALIGNANT NEOPLASM OF PROSTATE (HCC): Primary | ICD-10-CM

## 2021-01-15 PROCEDURE — 1036F TOBACCO NON-USER: CPT | Performed by: UROLOGY

## 2021-01-15 PROCEDURE — G8484 FLU IMMUNIZE NO ADMIN: HCPCS | Performed by: UROLOGY

## 2021-01-15 PROCEDURE — G8417 CALC BMI ABV UP PARAM F/U: HCPCS | Performed by: UROLOGY

## 2021-01-15 PROCEDURE — 1123F ACP DISCUSS/DSCN MKR DOCD: CPT | Performed by: UROLOGY

## 2021-01-15 PROCEDURE — 4040F PNEUMOC VAC/ADMIN/RCVD: CPT | Performed by: UROLOGY

## 2021-01-15 PROCEDURE — 99215 OFFICE O/P EST HI 40 MIN: CPT | Performed by: UROLOGY

## 2021-01-15 PROCEDURE — 3017F COLORECTAL CA SCREEN DOC REV: CPT | Performed by: UROLOGY

## 2021-01-15 PROCEDURE — G8427 DOCREV CUR MEDS BY ELIG CLIN: HCPCS | Performed by: UROLOGY

## 2021-01-15 ASSESSMENT — ENCOUNTER SYMPTOMS
EYE DISCHARGE: 0
NAUSEA: 0
VOMITING: 0
CHEST TIGHTNESS: 0
SHORTNESS OF BREATH: 0
EYE REDNESS: 0
FACIAL SWELLING: 0
TROUBLE SWALLOWING: 0
COLOR CHANGE: 0

## 2021-01-15 NOTE — PROGRESS NOTES
Elodia Chin is a 72 y.o. male who presents today   Chief Complaint   Patient presents with    Consultation     I am here today to discuss my pathology results. Prostate Cancer:  Patient is here today for prostate cancer which was first diagnosed on 12/14/2020. His last several PSA values are as follows:  Lab Results   Component Value Date    PSA 10.98 (H) 11/09/2020    PSA 9.53 (H) 09/28/2020     His prostate volume was 37.9 grams. PSAD 0.29  He had a prostate biopsy consisting of a total of 12 cores with 2 positive cores. TRUS Findings consisted of no abnormality seen on ultrasound  He has left sided disease with a [place] score of 3+3 = 6. Location of the the Cancer: Left lateral base, left lateral mid gland  His clinical TNM stage was: T1c  His pathological TNM stage was: T2a [facility]  The patient has not had a staging CT and bone scan. Previous treatment of prostate cancer: none  Patient has Normal erectile function no            Prostate Cancer Treatment Options  I have discussed in great detail with the patient the natural history, diagnosis and treatment of prostate cancer. I explained the [place] grading system, Staging system, correlation of disease with PSA levels, and  as well as the various treatments available for prostate cancer. I discussed the following options:  1. Watchful waiting / Active surveillance. I told that this approach was appropriate for older patients, patients with a life expectancy of 10 years or less and patients with low grade, low volume disease. This approach will require serial OCTAVIA's, PSA's and possibly repeat prostate biopsy to check for grade or stage progression. 2.  Hormonal Therapy. I discussed the role of hormonal therapy in the form of LHRH agonists or antagonists such as Lupron, etc. Or surgical castration in the form of bilateral orchiectomy.   The patient was told that this is primarily used in patients with shahab or metastatic disease or in conjunction with radiation therapy. The side effects include hot flashes, fatigue, breast enlargement, diminished libido, ED and long term development of osteoporosis, and cardiovascular disease risk. The patient was told he will encouraged to take supplemental Calcium and Vitamin D to prevent bone loss and may need additional medications for his bones. 3.  Radiation Therapy in the form of external beam radiation (IMRT) or prostate brachytherapy. Patient told that IMRT is given 5 days a week for 7-8 weeks and the side effects include rectal and bladder injury (OAB), erectile dysfunction (ED) and incontinence, urethral stricture disease. Long term the patient may experience hematuria and radiation cystitis or proctitis. Brachytherapy is done as an outpatient procedure under general anesthesia and postop the patient may experience dysuria, urgency, frequency, urge incontinence, increasing obstructive voiding symptoms and to a lesser degree than IMRT, rectal radiation injury and ED. Patients may require concomitant hormonal therapy with IMRT depending on the grade and extent of cancer. The patient may require hormonal therapy to downsize the prostate gland prior to brachytherapy. 4.  Open or Robotic assisted laparoscopic radical prostatectomy. Patient told about the options of open vs. Robotic assisted laparoscopic prostatectomy with or without pelvic lymphadenectomy. I discussed the risks including infection, bleeding, the risks of anesthesia, DVT, PE, MI, stroke, death, rectal injury and urethral stricture/bladder neck contracture. Urine leak, and lymphocele. I discussed the side effect of stress urinary incontinence which is temporary for most patients. I discussed the side effect of ED and the fact that it may take 6-18 months to recover this function. 5.  Cryotherapy.   I discussed that I do not perform this as a primary therapy, and in most cases this is done after failure of radiation therapy and would require referral to an outside facility who does Cryrotherapy. Past Medical History:   Diagnosis Date    Atrial fibrillation ([address]     Diverticulitis     Elevated PSA     HTN (hypertension)     Unspecified sleep apnea     CPAP    Wears glasses        Past Surgical History:   Procedure Laterality Date    COLON SURGERY  2014    perf-Dr Nena Carmona OTHER SURGICAL HISTORY  5/27/13    heart mapping & electrophysiologic ablation    OTHER SURGICAL HISTORY  5/11/15    colostomy takedown    SUBTOTAL COLECTOMY         Current Outpatient Medications   Medication Sig Dispense Refill    losartan-hydroCHLOROthiazide (HYZAAR) 100-25 MG per tablet Take 1 tablet by mouth daily 90 tablet 0    ZINC PO Take 10 mg by mouth 2 times daily      zoster recombinant adjuvanted vaccine (SHINGRIX) 50 MCG/0.5ML SUSR injection Inject 0.5 mLs into the muscle See Admin Instructions 1 dose now and repeat in 2-6 months 0.5 mL 0    Ascorbic Acid (VITAMIN C) 500 MG tablet Take 500 mg by mouth daily      aspirin 325 MG tablet Take 325 mg by mouth daily      Cholecalciferol (VITAMIN D PO) Take by mouth      vitamin E 1000 UNITS capsule Take 1,000 Units by mouth daily       No current facility-administered medications for this visit.         Allergies   Allergen Reactions    Pcn [Penicillins]     Sulfa Antibiotics     Levofloxacin Other (See Comments)     \"childhood allergy\"       Social History     Socioeconomic History    Marital status:      Spouse name: None    Number of children: None    Years of education: None    Highest education level: None   Occupational History    None   Social Needs    Financial resource strain: None    Food insecurity     Worry: None     Inability: None    Transportation needs     Medical: None     Non-medical: None   Tobacco Use    Smoking status: Former Smoker     Packs/day: 0.25     Years: 5.00     Pack years: 1.25     Start date: 1972     Quit date: 2001     Years since quitting: 20.0    Smokeless tobacco: Never Used    Tobacco comment: Self Employed    Substance and Sexual Activity    Alcohol use: Yes     Alcohol/week: 1.0 standard drinks     Types: 1 Glasses of wine per week     Comment: glass of Merlot per day    Drug use: No    Sexual activity: None   Lifestyle    Physical activity     Days per week: None     Minutes per session: None    Stress: None   Relationships    Social connections     Talks on phone: None     Gets together: None     Attends Shinto service: None     Active member of club or organization: None     Attends meetings of clubs or organizations: None     Relationship status: None    Intimate partner violence     Fear of current or ex partner: None     Emotionally abused: None     Physically abused: None     Forced sexual activity: None   Other Topics Concern    None   Social History Narrative    None       Family History   Problem Relation Age of Onset    Heart Failure Father     Cancer Father         colon    Heart Disease Father         CHF    Diabetes Mother     Heart Disease Maternal Grandmother     Heart Disease Maternal Grandfather        REVIEW OF SYSTEMS:  Review of Systems   Constitutional: Negative for chills and fever. HENT: Negative for facial swelling and trouble swallowing. Eyes: Negative for discharge and redness. Respiratory: Negative for chest tightness and shortness of breath. Cardiovascular: Negative for chest pain and palpitations. Gastrointestinal: Negative for nausea and vomiting. Endocrine: Negative for cold intolerance and heat intolerance. Genitourinary: Negative for decreased urine volume and genital sores. Musculoskeletal: Negative for joint swelling and neck pain. Skin: Negative for color change and rash. Allergic/Immunologic: Negative for environmental allergies and immunocompromised state. Neurological: Negative for dizziness and numbness. Hematological: Negative for adenopathy.  Does not bruise/bleed easily. Psychiatric/Behavioral: Negative for behavioral problems and hallucinations. PHYSICAL EXAM:  Temp 97.8 °F (36.6 °C) (Temporal)   Ht 5' 6\" (1.676 m)   Wt 245 lb (111.1 kg)   BMI 39.54 kg/m²   Physical Exam  Constitutional:       General: He is not in acute distress. Appearance: Normal appearance. He is well-developed. HENT:      Head: Normocephalic and atraumatic. Nose: Nose normal.   Eyes:      General: No scleral icterus. Conjunctiva/sclera: Conjunctivae normal.      Pupils: Pupils are equal, round, and reactive to light. Neck:      Musculoskeletal: Normal range of motion and neck supple. Trachea: No tracheal deviation. Cardiovascular:      Rate and Rhythm: Normal rate and regular rhythm. Pulmonary:      Effort: Pulmonary effort is normal. No respiratory distress. Breath sounds: No stridor. Abdominal:      General: There is no distension. Palpations: Abdomen is soft. There is no mass. Tenderness: There is no abdominal tenderness. Musculoskeletal: Normal range of motion. General: No tenderness. Lymphadenopathy:      Cervical: No cervical adenopathy. Skin:     General: Skin is warm and dry. Findings: No erythema. Neurological:      Mental Status: He is alert and oriented to person, place, and time. Psychiatric:         Behavior: Behavior normal.         Judgment: Judgment normal.             DATA:  CMP:    Lab Results   Component Value Date     09/28/2020    K 3.9 09/28/2020     09/28/2020    CO2 27 09/28/2020    BUN 18 09/28/2020    CREATININE 0.8 09/28/2020    GFRAA >59 09/28/2020    LABGLOM >60 09/28/2020    GLUCOSE 155 09/28/2020    PROT 7.1 09/28/2020    LABALBU 4.4 09/28/2020    CALCIUM 9.2 09/28/2020    BILITOT 0.4 09/28/2020    ALKPHOS 56 09/28/2020    AST 19 09/28/2020    ALT 17 09/28/2020     No results found for this visit on 01/15/21.   Lab Results   Component Value Date    PSA 10.98 (H) 2020    PSA 9.53 (H) 2020     No results found for: PSAFREEPCT    [facility]                                   Lo Agmariza    Department of Pathology   FINAL SURGICAL PATHOLOGY REPORT   Patient Name: Alyssa Hudson        Accession No:  [id number]    Age Sex:   1950    70 Y M        Pt Type: R     KMURO                                              Location:   Account #     [de-identified]                 Collected:     2020   Med Rec #      [id number]                    Received:      2020   Attend Phys: Heather Wild             Completed:     2020   Perform Phys: Fabby Coleman     FINAL DIAGNOSIS:     A.  Prostate, right lateral base needle biopsy: Benign prostatic   parenchyma. B.  Prostate, right lateral mid needle biopsy: Benign prostatic   parenchyma. C.  Prostate, right lateral apex needle biopsy: Benign prostatic   parenchyma. D.  Prostate, right base needle biopsy: Focal atypical small acinar   proliferation. E.  Prostate, right mid needle biopsy: Benign prostatic parenchyma. Nima Herb, right apex needle biopsy: Benign prostatic parenchyma. G.  Prostate, left lateral base needle biopsy: Prostatic adenocarcinoma   (Jeremías's grade 3+3=6), measuring 0.1 cm in greatest dimension and   occupying approximately 5% of the core, grade group 1. H.  Prostate, left lateral mid needle biopsy: Prostatic adenocarcinoma   (Jeremías's grade 3+3=6), measuring less than 0.1 cm in greatest dimension   and occupying less than 5% of the core, grade group 1. I.  Prostate, left lateral apex needle biopsy: Benign prostatic   parenchyma. J.  Prostate, left base needle biopsy: Benign prostatic parenchyma. K.  Prostate, left mid needle biopsy: Benign prostatic parenchyma. L.  Prostate, left apex needle biopsy: Benign prostatic parenchyma.      COMMENT:    Clinical correlation is suggested.     CBG/CBG          1. Malignant neoplasm of prostate [facility])  Patient comes in today for cancer consultation 100% of the face-to-face time was spent with the patient for discussion of biopsy results. Risk stratification, prognostic information. And management options, recommendations and decisions. Patient was able to ask questions. I answer them to the best my ability. In addition we had coordination of care and a total of 45 minutes was spent with the patient. After thorough discussion patient has chosen to have active surveillance. We will get a Oncotype DX genetic tumor test prior to his next appointment follow-up in 3 months with PSA prior  - PSA, Diagnostic; Future      Orders Placed This Encounter   Procedures    PSA, Diagnostic     PSA in 3 months     Standing Status:   Future     Standing Expiration Date:   1/15/2022        Return in about 3 months (around 4/15/2021) for PSA prior to vext visit. All information inputted into the note by the MA to include chief complaint, past medical history, past surgical history, medications, allergies, social and family history and review of systems has been reviewed and updated as needed by me. EMR Dragon/transcription disclaimer: Much of this documentt is electronic  transcription/translation of spoken language to printed text. The  electronic translation of spoken language may be erroneous, or at times,  nonsensical words or phrases may be inadvertently transcribed.  Although I  have reviewed the document for such errors, some may still exist.

## 2021-02-24 ENCOUNTER — TELEPHONE (OUTPATIENT)
Dept: PRIMARY CARE CLINIC | Age: 71
End: 2021-02-24

## 2021-02-24 DIAGNOSIS — I10 HYPERTENSION, UNSPECIFIED TYPE: ICD-10-CM

## 2021-02-24 RX ORDER — LOSARTAN POTASSIUM AND HYDROCHLOROTHIAZIDE 25; 100 MG/1; MG/1
1 TABLET ORAL DAILY
Qty: 90 TABLET | Refills: 2 | Status: SHIPPED | OUTPATIENT
Start: 2021-02-24 | End: 2021-12-23

## 2021-03-29 ENCOUNTER — IMMUNIZATION (OUTPATIENT)
Age: 71
End: 2021-03-29
Payer: MEDICARE

## 2021-03-29 PROCEDURE — 91300 COVID-19, PFIZER VACCINE 30MCG/0.3ML DOSE: CPT | Performed by: FAMILY MEDICINE

## 2021-03-29 PROCEDURE — 0001A COVID-19, PFIZER VACCINE 30MCG/0.3ML DOSE: CPT | Performed by: FAMILY MEDICINE

## 2021-04-19 ENCOUNTER — IMMUNIZATION (OUTPATIENT)
Age: 71
End: 2021-04-19
Payer: MEDICARE

## 2021-04-19 PROCEDURE — 0002A PR IMM ADMN SARSCOV2 30MCG/0.3ML DIL RECON 2ND DOSE: CPT | Performed by: FAMILY MEDICINE

## 2021-04-19 PROCEDURE — 91300 COVID-19, PFIZER VACCINE 30MCG/0.3ML DOSE: CPT | Performed by: FAMILY MEDICINE

## 2021-04-26 ENCOUNTER — OFFICE VISIT (OUTPATIENT)
Dept: UROLOGY | Age: 71
End: 2021-04-26
Payer: MEDICARE

## 2021-04-26 DIAGNOSIS — C61 PROSTATE CANCER (HCC): Primary | ICD-10-CM

## 2021-04-26 PROCEDURE — G8427 DOCREV CUR MEDS BY ELIG CLIN: HCPCS | Performed by: UROLOGY

## 2021-04-26 PROCEDURE — 1123F ACP DISCUSS/DSCN MKR DOCD: CPT | Performed by: UROLOGY

## 2021-04-26 PROCEDURE — 1036F TOBACCO NON-USER: CPT | Performed by: UROLOGY

## 2021-04-26 PROCEDURE — 3017F COLORECTAL CA SCREEN DOC REV: CPT | Performed by: UROLOGY

## 2021-04-26 PROCEDURE — 4040F PNEUMOC VAC/ADMIN/RCVD: CPT | Performed by: UROLOGY

## 2021-04-26 PROCEDURE — G8417 CALC BMI ABV UP PARAM F/U: HCPCS | Performed by: UROLOGY

## 2021-04-26 PROCEDURE — 99214 OFFICE O/P EST MOD 30 MIN: CPT | Performed by: UROLOGY

## 2021-04-26 ASSESSMENT — ENCOUNTER SYMPTOMS
SORE THROAT: 0
COUGH: 0
BACK PAIN: 0
NAUSEA: 0
EYE PAIN: 0
WHEEZING: 0
VOMITING: 0

## 2021-04-26 NOTE — PROGRESS NOTES
Catrina Dubon is a 72 y.o. male who presents today   Chief Complaint   Patient presents with    Follow-up     I am here for my 3 month follow up for prostate cancer. I had my psa prior to appt. Prostate Cancer  Patient is here today for prostate cancer which was first diagnosed approximately 5 months ago. His prostate cancer can be characterized as clinical stage T1c, [place] 3+3 = 6 involving 2 of 12 cores. Low risk. Patient had a Oncotype DX score less than average risk and low risk for cancer death, metastasis or adverse pathology confirming he was a good candidate for AMS  His last several PSA values are as follows:  Lab Results   Component Value Date    PSA 10.98 (H) 11/09/2020    PSA 9.53 (H) 09/28/2020   Most recent PSA done at outside hospital on 4/19/2021 was 8.1. This is down from previous  Previous treatment of prostate cancer:  Active surveillance  Lower urinary tract symptoms: urgency, frequency, decreased urinary stream and nocturia, 1 times per night he has mild to moderate symptoms his AUA symptom score is 8 he seems to be pleased with the way he is urinating        Past Medical History:   Diagnosis Date    Atrial fibrillation ([address]     Diverticulitis     Elevated PSA     HTN (hypertension)     Unspecified sleep apnea     CPAP    Wears glasses        Past Surgical History:   Procedure Laterality Date    COLON SURGERY  2014    perf-Dr Chaya Silverio OTHER SURGICAL HISTORY  5/27/13    heart mapping & electrophysiologic ablation    OTHER SURGICAL HISTORY  5/11/15    colostomy takedown    SUBTOTAL COLECTOMY         Current Outpatient Medications   Medication Sig Dispense Refill    losartan-hydroCHLOROthiazide (HYZAAR) 100-25 MG per tablet Take 1 tablet by mouth daily Patient will need appointment for further refills 90 tablet 2    ZINC PO Take 10 mg by mouth 2 times daily      Ascorbic Acid (VITAMIN C) 500 MG tablet Take 500 mg by mouth daily      aspirin 325 MG tablet Take 325 mg by mouth daily      Cholecalciferol (VITAMIN D PO) Take by mouth      vitamin E 1000 UNITS capsule Take 1,000 Units by mouth daily       No current facility-administered medications for this visit. Allergies   Allergen Reactions    Pcn [Penicillins]     Sulfa Antibiotics     Levofloxacin Other (See Comments)     \"childhood allergy\"       Social History     Socioeconomic History    Marital status:      Spouse name: None    Number of children: None    Years of education: None    Highest education level: None   Occupational History    None   Social Needs    Financial resource strain: None    Food insecurity     Worry: None     Inability: None    Transportation needs     Medical: None     Non-medical: None   Tobacco Use    Smoking status: Former Smoker     Packs/day: 0.25     Years: 5.00     Pack years: 1.25     Start date:      Quit date:      Years since quittin.3    Smokeless tobacco: Never Used    Tobacco comment: Self Employed    Substance and Sexual Activity    Alcohol use:  Yes     Alcohol/week: 1.0 standard drinks     Types: 1 Glasses of wine per week     Comment: glass of Merlot per day    Drug use: No    Sexual activity: None   Lifestyle    Physical activity     Days per week: None     Minutes per session: None    Stress: None   Relationships    Social connections     Talks on phone: None     Gets together: None     Attends Taoist service: None     Active member of club or organization: None     Attends meetings of clubs or organizations: None     Relationship status: None    Intimate partner violence     Fear of current or ex partner: None     Emotionally abused: None     Physically abused: None     Forced sexual activity: None   Other Topics Concern    None   Social History Narrative    None       Family History   Problem Relation Age of Onset    Heart Failure Father     Cancer Father         colon    Heart Disease Father         CHF    Diabetes Mother  Heart Disease Maternal Grandmother     Heart Disease Maternal Grandfather        REVIEW OF SYSTEMS:  Review of Systems   Constitutional: Negative for chills and fever. HENT: Negative for congestion and sore throat. Eyes: Negative for pain and visual disturbance. Respiratory: Negative for cough and wheezing. Cardiovascular: Negative for chest pain and palpitations. Gastrointestinal: Negative for nausea and vomiting. Endocrine: Negative for polyphagia and polyuria. Genitourinary: Negative for decreased urine volume, difficulty urinating, discharge, dysuria, enuresis, flank pain, frequency, genital sores, hematuria, penile pain, penile swelling, scrotal swelling, testicular pain and urgency. Musculoskeletal: Negative for back pain and neck pain. Skin: Negative for rash and wound. Allergic/Immunologic: Negative for environmental allergies and food allergies. Neurological: Negative for dizziness and headaches. Hematological: Negative for adenopathy. Does not bruise/bleed easily. Psychiatric/Behavioral: Negative for confusion and hallucinations. All other systems reviewed and are negative. PHYSICAL EXAM:  There were no vitals taken for this visit. Physical Exam  Constitutional:       General: He is not in acute distress. Appearance: Normal appearance. He is well-developed. HENT:      Head: Normocephalic and atraumatic. Nose: Nose normal.   Eyes:      General: No scleral icterus. Conjunctiva/sclera: Conjunctivae normal.      Pupils: Pupils are equal, round, and reactive to light. Neck:      Musculoskeletal: Normal range of motion and neck supple. Trachea: No tracheal deviation. Cardiovascular:      Rate and Rhythm: Normal rate and regular rhythm. Pulmonary:      Effort: Pulmonary effort is normal. No respiratory distress. Breath sounds: No stridor. Abdominal:      General: There is no distension. Palpations: Abdomen is soft. There is no mass.

## 2021-10-25 ENCOUNTER — OFFICE VISIT (OUTPATIENT)
Dept: UROLOGY | Age: 71
End: 2021-10-25
Payer: MEDICARE

## 2021-10-25 VITALS — WEIGHT: 245.6 LBS | BODY MASS INDEX: 39.47 KG/M2 | HEIGHT: 66 IN

## 2021-10-25 DIAGNOSIS — C61 PROSTATE CANCER (HCC): Primary | ICD-10-CM

## 2021-10-25 PROCEDURE — G8427 DOCREV CUR MEDS BY ELIG CLIN: HCPCS | Performed by: UROLOGY

## 2021-10-25 PROCEDURE — G8417 CALC BMI ABV UP PARAM F/U: HCPCS | Performed by: UROLOGY

## 2021-10-25 PROCEDURE — 81002 URINALYSIS NONAUTO W/O SCOPE: CPT | Performed by: UROLOGY

## 2021-10-25 PROCEDURE — 1036F TOBACCO NON-USER: CPT | Performed by: UROLOGY

## 2021-10-25 PROCEDURE — 4040F PNEUMOC VAC/ADMIN/RCVD: CPT | Performed by: UROLOGY

## 2021-10-25 PROCEDURE — G8484 FLU IMMUNIZE NO ADMIN: HCPCS | Performed by: UROLOGY

## 2021-10-25 PROCEDURE — 3017F COLORECTAL CA SCREEN DOC REV: CPT | Performed by: UROLOGY

## 2021-10-25 PROCEDURE — 1123F ACP DISCUSS/DSCN MKR DOCD: CPT | Performed by: UROLOGY

## 2021-10-25 PROCEDURE — 99213 OFFICE O/P EST LOW 20 MIN: CPT | Performed by: UROLOGY

## 2021-10-25 ASSESSMENT — ENCOUNTER SYMPTOMS
SORE THROAT: 0
EYE REDNESS: 0
NAUSEA: 0
WHEEZING: 0
BACK PAIN: 0
VOMITING: 0
CHEST TIGHTNESS: 0
EYE DISCHARGE: 0
FACIAL SWELLING: 0

## 2021-10-25 NOTE — PROGRESS NOTES
Mariya Saenz is a 80 y.o. male who presents today   Chief Complaint   Patient presents with    Follow-up     I am here for a 6 month FU. I had my PSA done, it is in media. Prostate Cancer  Patient is here today for prostate cancer which was first diagnosed approximately 10 months ago. His prostate cancer can be characterized as clinical stage T1c, [place] 6 involving 2 of 12 cores occupying approximately 5% in each core. Low risk. Oncotype DX score was less than average risk for his risk category. He has stable PSA  His last several PSA values are as follows:  Lab Results   Component Value Date    PSA 10.98 (H) 11/09/2020    PSA 9.53 (H) 09/28/2020   Most recent PSA done outside lab on 10/18/2021 was 10.8. This is up from previous but stable since diagnosis. Previous PSA done on 4/19/2021 was 8.1  Previous treatment of prostate cancer:  Active surveillance  Lower urinary tract symptoms: urgency, frequency and nocturia, 1 times per night        Past Medical History:   Diagnosis Date    Atrial fibrillation ([address]     Diverticulitis     Elevated PSA     HTN (hypertension)     Unspecified sleep apnea     CPAP    Wears glasses        Past Surgical History:   Procedure Laterality Date    COLON SURGERY  2014    perf-Dr Rebecca Sibley OTHER SURGICAL HISTORY  5/27/13    heart mapping & electrophysiologic ablation    OTHER SURGICAL HISTORY  5/11/15    colostomy takedown    SUBTOTAL COLECTOMY         Current Outpatient Medications   Medication Sig Dispense Refill    losartan-hydroCHLOROthiazide (HYZAAR) 100-25 MG per tablet Take 1 tablet by mouth daily Patient will need appointment for further refills 90 tablet 2    ZINC PO Take 10 mg by mouth 2 times daily      Ascorbic Acid (VITAMIN C) 500 MG tablet Take 500 mg by mouth daily      aspirin 325 MG tablet Take 325 mg by mouth daily      Cholecalciferol (VITAMIN D PO) Take by mouth      vitamin E 1000 UNITS capsule Take 1,000 Units by mouth daily       No current facility-administered medications for this visit. Allergies   Allergen Reactions    Pcn [Penicillins]     Sulfa Antibiotics     Levofloxacin Other (See Comments)     \"childhood allergy\"       Social History     Socioeconomic History    Marital status:      Spouse name: None    Number of children: None    Years of education: None    Highest education level: None   Occupational History    None   Tobacco Use    Smoking status: Former Smoker     Packs/day: 0.25     Years: 5.00     Pack years: 1.25     Start date: 0     Quit date:      Years since quittin.8    Smokeless tobacco: Never Used    Tobacco comment: Self Employed    Vaping Use    Vaping Use: Never used   Substance and Sexual Activity    Alcohol use: Yes     Alcohol/week: 1.0 standard drinks     Types: 1 Glasses of wine per week     Comment: glass of Merlot per day    Drug use: No    Sexual activity: None   Other Topics Concern    None   Social History Narrative    None     Social Determinants of Health     Financial Resource Strain:     Difficulty of Paying Living Expenses:    Food Insecurity:     Worried About Running Out of Food in the Last Year:     Ran Out of Food in the Last Year:    Transportation Needs:     Lack of Transportation (Medical):      Lack of Transportation (Non-Medical):    Physical Activity:     Days of Exercise per Week:     Minutes of Exercise per Session:    Stress:     Feeling of Stress :    Social Connections:     Frequency of Communication with Friends and Family:     Frequency of Social Gatherings with Friends and Family:     Attends Orthodox Services:     Active Member of Clubs or Organizations:     Attends Club or Organization Meetings:     Marital Status:    Intimate Partner Violence:     Fear of Current or Ex-Partner:     Emotionally Abused:     Physically Abused:     Sexually Abused:        Family History   Problem Relation Age of Onset    Heart Failure Father  Cancer Father         colon    Heart Disease Father         CHF    Diabetes Mother     Heart Disease Maternal Grandmother     Heart Disease Maternal Grandfather        REVIEW OF SYSTEMS:  Review of Systems   Constitutional: Negative for chills and fever. HENT: Negative for facial swelling and sore throat. Eyes: Negative for discharge and redness. Respiratory: Negative for chest tightness and wheezing. Cardiovascular: Negative for chest pain and palpitations. Gastrointestinal: Negative for nausea and vomiting. Endocrine: Negative for polyphagia and polyuria. Genitourinary: Negative for decreased urine volume, difficulty urinating, discharge, dysuria, enuresis, flank pain, frequency, genital sores, hematuria, penile pain, penile swelling, scrotal swelling, testicular pain and urgency. Musculoskeletal: Negative for back pain and neck stiffness. Skin: Negative for rash and wound. Neurological: Negative for dizziness and headaches. Hematological: Negative for adenopathy. Does not bruise/bleed easily. Psychiatric/Behavioral: Negative for confusion and hallucinations. PHYSICAL EXAM:  Ht 5' 6\" (1.676 m)   Wt 245 lb 9.6 oz (111.4 kg)   BMI 39.64 kg/m²   Physical Exam  Constitutional:       General: He is not in acute distress. Appearance: Normal appearance. He is well-developed. HENT:      Head: Normocephalic and atraumatic. Nose: Nose normal.   Eyes:      General: No scleral icterus. Conjunctiva/sclera: Conjunctivae normal.      Pupils: Pupils are equal, round, and reactive to light. Neck:      Trachea: No tracheal deviation. Cardiovascular:      Rate and Rhythm: Normal rate and regular rhythm. Pulmonary:      Effort: Pulmonary effort is normal. No respiratory distress. Breath sounds: No stridor. Abdominal:      General: There is no distension. Palpations: Abdomen is soft. There is no mass. Tenderness: There is no abdominal tenderness. Musculoskeletal:         General: No tenderness. Normal range of motion. Cervical back: Normal range of motion and neck supple. Lymphadenopathy:      Cervical: No cervical adenopathy. Skin:     General: Skin is warm and dry. Findings: No erythema. Neurological:      Mental Status: He is alert and oriented to person, place, and time. Psychiatric:         Behavior: Behavior normal.         Judgment: Judgment normal.             DATA:    Results for orders placed or performed in visit on 10/25/21   POCT Urinalysis no Micro   Result Value Ref Range    Color, UA yellow     Clarity, UA clear     Glucose, UA POC neg     Bilirubin, UA neg     Ketones, UA neg     Spec Grav, UA 1.030     Blood, UA POC neg     pH, UA 5.0     Protein, UA POC neg     Urobilinogen, UA 0.2     Leukocytes, UA neg     Nitrite, UA neg     Appearance, Fluid Clear Clear, Slightly Cloudy     Lab Results   Component Value Date    PSA 10.98 (H) 11/09/2020    PSA 9.53 (H) 09/28/2020     PSA done 10/18/2021 was 10.8 ng/ML          1. Prostate cancer [facility])  His PSA remains essentially stable. He will follow-up to see me in 6 months with repeat PSA at that time we will schedule him for surveillance biopsy  - POCT Urinalysis no Micro  - PSA, Diagnostic; Future      Orders Placed This Encounter   Procedures    PSA, Diagnostic     PSA in 6 month     Standing Status:   Future     Standing Expiration Date:   10/25/2022    POCT Urinalysis no Micro        Return in about 6 months (around 4/25/2022) for PSA prior to vext visit. All information inputted into the note by the MA to include chief complaint, past medical history, past surgical history, medications, allergies, social and family history and review of systems has been reviewed and updated as needed by me. EMR Dragon/transcription disclaimer: Much of this documentt is electronic  transcription/translation of spoken language to printed text.  The  electronic translation of spoken language may be erroneous, or at times,  nonsensical words or phrases may be inadvertently transcribed.  Although I  have reviewed the document for such errors, some may still exist.

## 2021-12-23 DIAGNOSIS — I10 HYPERTENSION, UNSPECIFIED TYPE: ICD-10-CM

## 2021-12-23 RX ORDER — LOSARTAN POTASSIUM AND HYDROCHLOROTHIAZIDE 25; 100 MG/1; MG/1
TABLET ORAL
Qty: 90 TABLET | Refills: 0 | Status: SHIPPED | OUTPATIENT
Start: 2021-12-23 | End: 2022-03-21 | Stop reason: SDUPTHER

## 2022-03-21 ENCOUNTER — OFFICE VISIT (OUTPATIENT)
Dept: PRIMARY CARE CLINIC | Age: 72
End: 2022-03-21
Payer: MEDICARE

## 2022-03-21 VITALS
SYSTOLIC BLOOD PRESSURE: 138 MMHG | BODY MASS INDEX: 39.21 KG/M2 | HEART RATE: 83 BPM | DIASTOLIC BLOOD PRESSURE: 76 MMHG | OXYGEN SATURATION: 98 % | TEMPERATURE: 98.4 F | HEIGHT: 66 IN | WEIGHT: 244 LBS

## 2022-03-21 DIAGNOSIS — I10 PRIMARY HYPERTENSION: Primary | ICD-10-CM

## 2022-03-21 DIAGNOSIS — E66.01 SEVERE OBESITY (BMI 35.0-39.9) WITH COMORBIDITY (HCC): ICD-10-CM

## 2022-03-21 DIAGNOSIS — I10 HYPERTENSION, UNSPECIFIED TYPE: ICD-10-CM

## 2022-03-21 PROCEDURE — 99214 OFFICE O/P EST MOD 30 MIN: CPT | Performed by: NURSE PRACTITIONER

## 2022-03-21 PROCEDURE — 1123F ACP DISCUSS/DSCN MKR DOCD: CPT | Performed by: NURSE PRACTITIONER

## 2022-03-21 PROCEDURE — G8417 CALC BMI ABV UP PARAM F/U: HCPCS | Performed by: NURSE PRACTITIONER

## 2022-03-21 PROCEDURE — G8484 FLU IMMUNIZE NO ADMIN: HCPCS | Performed by: NURSE PRACTITIONER

## 2022-03-21 PROCEDURE — 1036F TOBACCO NON-USER: CPT | Performed by: NURSE PRACTITIONER

## 2022-03-21 PROCEDURE — G8427 DOCREV CUR MEDS BY ELIG CLIN: HCPCS | Performed by: NURSE PRACTITIONER

## 2022-03-21 PROCEDURE — 3017F COLORECTAL CA SCREEN DOC REV: CPT | Performed by: NURSE PRACTITIONER

## 2022-03-21 PROCEDURE — 4040F PNEUMOC VAC/ADMIN/RCVD: CPT | Performed by: NURSE PRACTITIONER

## 2022-03-21 RX ORDER — LOSARTAN POTASSIUM AND HYDROCHLOROTHIAZIDE 25; 100 MG/1; MG/1
TABLET ORAL
Qty: 90 TABLET | Refills: 0 | OUTPATIENT
Start: 2022-03-21

## 2022-03-21 RX ORDER — LOSARTAN POTASSIUM AND HYDROCHLOROTHIAZIDE 25; 100 MG/1; MG/1
TABLET ORAL
Qty: 90 TABLET | Refills: 3 | Status: SHIPPED | OUTPATIENT
Start: 2022-03-21

## 2022-03-21 SDOH — ECONOMIC STABILITY: FOOD INSECURITY: WITHIN THE PAST 12 MONTHS, THE FOOD YOU BOUGHT JUST DIDN'T LAST AND YOU DIDN'T HAVE MONEY TO GET MORE.: NEVER TRUE

## 2022-03-21 SDOH — ECONOMIC STABILITY: FOOD INSECURITY: WITHIN THE PAST 12 MONTHS, YOU WORRIED THAT YOUR FOOD WOULD RUN OUT BEFORE YOU GOT MONEY TO BUY MORE.: NEVER TRUE

## 2022-03-21 ASSESSMENT — ENCOUNTER SYMPTOMS
COUGH: 0
NAUSEA: 0
PHOTOPHOBIA: 0
VOMITING: 0
COLOR CHANGE: 0
VOICE CHANGE: 0
SHORTNESS OF BREATH: 0
RHINORRHEA: 0
BACK PAIN: 0

## 2022-03-21 ASSESSMENT — PATIENT HEALTH QUESTIONNAIRE - PHQ9
SUM OF ALL RESPONSES TO PHQ QUESTIONS 1-9: 0
SUM OF ALL RESPONSES TO PHQ QUESTIONS 1-9: 0
1. LITTLE INTEREST OR PLEASURE IN DOING THINGS: 0
SUM OF ALL RESPONSES TO PHQ QUESTIONS 1-9: 0
2. FEELING DOWN, DEPRESSED OR HOPELESS: 0
SUM OF ALL RESPONSES TO PHQ9 QUESTIONS 1 & 2: 0
SUM OF ALL RESPONSES TO PHQ QUESTIONS 1-9: 0

## 2022-03-21 ASSESSMENT — SOCIAL DETERMINANTS OF HEALTH (SDOH): HOW HARD IS IT FOR YOU TO PAY FOR THE VERY BASICS LIKE FOOD, HOUSING, MEDICAL CARE, AND HEATING?: NOT HARD AT ALL

## 2022-03-21 NOTE — PROGRESS NOTES
[facility]  [address] [place] [address] Shaina Torres [address]  Dept: [phone]  Dept Fax: [phone]  Loc: [phone]    Adriana Mitchell is a 80 y.o. male who presents today for his medical conditions/complaints as noted below. Adriana Mitchell is c/o of Medication Refill and Hypertension        HPI:     HPI   Chief Complaint   Patient presents with    Medication Refill    Hypertension     Patient presents today for evaluation of hypertension. He has been taking Losartan-HCTZ 100/25 mg daily. He denies missing doses. He denies shortness of breath or chest pain. He is due for fasting labs.      Past Medical History:   Diagnosis Date    Atrial fibrillation ([address]     Diverticulitis     Elevated PSA     HTN (hypertension)     Unspecified sleep apnea     CPAP    Wears glasses       Past Surgical History:   Procedure Laterality Date    COLON SURGERY  2014    carrington-Dr QUEZADA    OTHER SURGICAL HISTORY  5/27/13    heart mapping & electrophysiologic ablation    OTHER SURGICAL HISTORY  5/11/15    colostomy takedown    SUBTOTAL COLECTOMY         Vitals 3/21/2022 3/21/2022 10/25/2021 1/15/2021 12/14/2020 20/13/3996   SYSTOLIC 913 229 - - - -   DIASTOLIC 76 78 - - - -   Site - Left Upper Arm - - - -   Position - Sitting - - - -   Pulse - 83 - - - 80   Temp - 98.4 - 97.8 - 97.1   Resp - - - - - -   SpO2 - 98 - - - 98   Weight - 244 lb 245 lb 9.6 oz 245 lb 240 lb -   Height - 5' 6\" 5' 6\" 5' 6\" 5' 6\" -   Body mass index - 39.38 kg/m2 39.64 kg/m2 39.54 kg/m2 38.73 kg/m2 -   Some recent data might be hidden       Family History   Problem Relation Age of Onset    Heart Failure Father     Cancer Father         colon    Heart Disease Father         CHF    Diabetes Mother     Heart Disease Maternal Grandmother     Heart Disease Maternal Grandfather        Social History     Tobacco Use    Smoking status: Former Smoker     Packs/day: 0.25     Years: 5.00     Pack [FreeTextEntry1] : Bilateral knee arthritis years: 1.25     Start date: 0     Quit date:      Years since quittin.2    Smokeless tobacco: Never Used    Tobacco comment: Self Employed    Substance Use Topics    Alcohol use: Yes     Alcohol/week: 1.0 standard drink     Types: 1 Glasses of wine per week     Comment: glass of Merlot per day      Current Outpatient Medications on File Prior to Visit   Medication Sig Dispense Refill    ZINC PO Take 10 mg by mouth 2 times daily      Ascorbic Acid (VITAMIN C) 500 MG tablet Take 500 mg by mouth daily      aspirin 325 MG tablet Take 325 mg by mouth daily      Cholecalciferol (VITAMIN D PO) Take by mouth      vitamin E 1000 UNITS capsule Take 1,000 Units by mouth daily       No current facility-administered medications on file prior to visit. Allergies   Allergen Reactions    Pcn [Penicillins]     Sulfa Antibiotics     Levofloxacin Other (See Comments)     \"childhood allergy\"       Health Maintenance   Topic Date Due    Hepatitis C screen  Never done    Depression Screen  Never done    Lipid screen  Never done    Colorectal Cancer Screen  Never done    AAA screen  2016    A1C test (Diabetic or Prediabetic)  2019    Annual Wellness Visit (AWV)  Never done    Flu vaccine (1) 2021    Potassium monitoring  2021    Creatinine monitoring  2021    PSA counseling  2021    DTaP/Tdap/Td vaccine (2 - Td or Tdap) 2028    Shingles Vaccine  Completed    Pneumococcal 65+ years Vaccine  Completed    COVID-19 Vaccine  Completed    Hepatitis A vaccine  Aged Out    Hepatitis B vaccine  Aged Out    Hib vaccine  Aged Out    Meningococcal (ACWY) vaccine  Aged Out       Subjective:      Review of Systems   Constitutional: Negative for chills and fever. HENT: Negative for ear pain, hearing loss, rhinorrhea and voice change. Eyes: Negative for photophobia and visual disturbance. Respiratory: Negative for cough and shortness of breath. Cardiovascular: Negative for chest pain and palpitations. Gastrointestinal: Negative for nausea and vomiting. Endocrine: Negative. Negative for cold intolerance and heat intolerance. Genitourinary: Negative for difficulty urinating and flank pain. Musculoskeletal: Negative for back pain and neck pain. Skin: Negative for color change and rash. Allergic/Immunologic: Negative for environmental allergies and food allergies. Neurological: Negative for dizziness, speech difficulty and headaches. Hematological: Does not bruise/bleed easily. Psychiatric/Behavioral: Negative for sleep disturbance and suicidal ideas. Objective:     Physical Exam  Vitals and nursing note reviewed. Constitutional:       Appearance: He is well-developed. HENT:      Head: Atraumatic. Right Ear: External ear normal.      Left Ear: External ear normal.      Nose: Nose normal.   Eyes:      Conjunctiva/sclera: Conjunctivae normal.      Pupils: Pupils are equal, round, and reactive to light. Cardiovascular:      Rate and Rhythm: Normal rate and regular rhythm. Heart sounds: Normal heart sounds, S1 normal and S2 normal.   Pulmonary:      Effort: Pulmonary effort is normal.      Breath sounds: Normal breath sounds. Abdominal:      General: Bowel sounds are normal.      Palpations: Abdomen is soft. Musculoskeletal:         General: Normal range of motion. Cervical back: Normal range of motion and neck supple. Skin:     General: Skin is warm and dry. Neurological:      Mental Status: He is alert and oriented to person, place, and time. Psychiatric:         Behavior: Behavior normal.       /76   Pulse 83   Temp 98.4 °F (36.9 °C) (Temporal)   Ht 5' 6\" (1.676 m)   Wt 244 lb (110.7 kg)   SpO2 98%   BMI 39.38 kg/m²     Assessment:       Diagnosis Orders   1.  Primary hypertension  CBC with Auto Differential    Comprehensive Metabolic Panel    Lipid Panel    Hemoglobin A1C    TSH    Vitamin D 25 Hydroxy   2. Severe obesity (BMI 35.0-39. 9) with comorbidity ([address]     3. Hypertension, unspecified type  losartan-hydroCHLOROthiazide (HYZAAR) 100-25 MG per tablet         Plan:   More than 50% of the time was spent counseling and coordinating care for a total time of 30 min face to face. Fasting labs in [address]. Continue all medications as directed. Follow-up in 6 months or sooner if needed. PDMP Monitoring:    Last PDMP [place] as Reviewed [facility]):  Review User Review Instant Review Result            Urine Drug Screenings (1 yr)    No resulted procedures found. Medication Contract and Consent for Opioid Use Documents Filed     Patient Documents     Type of Document Status Date Received Received By Description    Medication Contract Received 5/9/2016 10:48 AM Lindsay Barrera                  Patient given educational materials -see patient instructions. Discussed use, benefit, and side effects of prescribed medications. All patient questions answered. Pt voiced understanding. Reviewed health maintenance. Instructed to continue currentmedications, diet and exercise. Patient agreed with treatment plan. Follow up as directed. MEDICATIONS:  Orders Placed This Encounter   Medications    losartan-hydroCHLOROthiazide (HYZAAR) 100-25 MG per tablet     Sig: TAKE 1 TABLET BY MOUTH ONCE DAILY     Dispense:  90 tablet     Refill:  3         ORDERS:  Orders Placed This Encounter   Procedures    CBC with Auto Differential    Comprehensive Metabolic Panel    Lipid Panel    Hemoglobin A1C    TSH    Vitamin D 25 Hydroxy       Follow-up:  Return in about 6 months (around 9/21/2022) for in office. PATIENT INSTRUCTIONS:  There are no Patient Instructions on file for this visit. Electronically signed by NINA Mendez on 3/21/2022 at 1:50 PM    EMR Dragon/transcription disclaimer:  Much of thisencounter note is electronic transcription/translation of spoken language to printed texts.   The electronic translation of spoken language may be erroneous, or at times, nonsensical words or phrases may be inadvertentlytranscribed.   Although I have reviewed the note for such errors, some may still exist.

## 2022-04-18 DIAGNOSIS — I10 PRIMARY HYPERTENSION: ICD-10-CM

## 2022-04-18 DIAGNOSIS — C61 PROSTATE CANCER (HCC): ICD-10-CM

## 2022-04-18 LAB
ALBUMIN SERPL-MCNC: 4.5 G/DL (ref 3.5–5.2)
ALP BLD-CCNC: 59 U/L (ref 40–130)
ALT SERPL-CCNC: 27 U/L (ref 5–41)
ANION GAP SERPL CALCULATED.3IONS-SCNC: 12 MMOL/L (ref 7–19)
AST SERPL-CCNC: 23 U/L (ref 5–40)
BASOPHILS ABSOLUTE: 0.1 K/UL (ref 0–0.2)
BASOPHILS RELATIVE PERCENT: 1.2 % (ref 0–1)
BILIRUB SERPL-MCNC: 0.5 MG/DL (ref 0.2–1.2)
BUN BLDV-MCNC: 14 MG/DL (ref 8–23)
CALCIUM SERPL-MCNC: 9.5 MG/DL (ref 8.8–10.2)
CHLORIDE BLD-SCNC: 100 MMOL/L (ref 98–111)
CHOLESTEROL, TOTAL: 213 MG/DL (ref 160–199)
CO2: 26 MMOL/L (ref 22–29)
CREAT SERPL-MCNC: 0.8 MG/DL (ref 0.5–1.2)
EOSINOPHILS ABSOLUTE: 0.3 K/UL (ref 0–0.6)
EOSINOPHILS RELATIVE PERCENT: 3.1 % (ref 0–5)
GFR AFRICAN AMERICAN: >59
GFR NON-AFRICAN AMERICAN: >60
GLUCOSE BLD-MCNC: 145 MG/DL (ref 74–109)
HBA1C MFR BLD: 6.2 % (ref 4–6)
HCT VFR BLD CALC: 47.9 % (ref 42–52)
HDLC SERPL-MCNC: 50 MG/DL (ref 55–121)
HEMOGLOBIN: 16.3 G/DL (ref 14–18)
IMMATURE GRANULOCYTES #: 0 K/UL
LDL CHOLESTEROL CALCULATED: 137 MG/DL
LYMPHOCYTES ABSOLUTE: 3.2 K/UL (ref 1.1–4.5)
LYMPHOCYTES RELATIVE PERCENT: 37.2 % (ref 20–40)
MCH RBC QN AUTO: 30.6 PG (ref 27–31)
MCHC RBC AUTO-ENTMCNC: 34 G/DL (ref 33–37)
MCV RBC AUTO: 89.9 FL (ref 80–94)
MONOCYTES ABSOLUTE: 0.7 K/UL (ref 0–0.9)
MONOCYTES RELATIVE PERCENT: 8.2 % (ref 0–10)
NEUTROPHILS ABSOLUTE: 4.3 K/UL (ref 1.5–7.5)
NEUTROPHILS RELATIVE PERCENT: 50.1 % (ref 50–65)
PDW BLD-RTO: 13 % (ref 11.5–14.5)
PLATELET # BLD: 234 K/UL (ref 130–400)
PMV BLD AUTO: 11.2 FL (ref 9.4–12.4)
POTASSIUM SERPL-SCNC: 3.9 MMOL/L (ref 3.5–5)
PROSTATE SPECIFIC ANTIGEN: 9.36 NG/ML (ref 0–4)
RBC # BLD: 5.33 M/UL (ref 4.7–6.1)
SODIUM BLD-SCNC: 138 MMOL/L (ref 136–145)
TOTAL PROTEIN: 7 G/DL (ref 6.6–8.7)
TRIGL SERPL-MCNC: 129 MG/DL (ref 0–149)
TSH SERPL DL<=0.05 MIU/L-ACNC: 2.11 UIU/ML (ref 0.27–4.2)
VITAMIN D 25-HYDROXY: 35.6 NG/ML
WBC # BLD: 8.6 K/UL (ref 4.8–10.8)

## 2022-04-25 ENCOUNTER — OFFICE VISIT (OUTPATIENT)
Dept: UROLOGY | Age: 72
End: 2022-04-25
Payer: MEDICARE

## 2022-04-25 VITALS
WEIGHT: 247 LBS | HEIGHT: 66 IN | BODY MASS INDEX: 39.7 KG/M2 | DIASTOLIC BLOOD PRESSURE: 80 MMHG | TEMPERATURE: 97.7 F | SYSTOLIC BLOOD PRESSURE: 134 MMHG

## 2022-04-25 DIAGNOSIS — C61 PROSTATE CANCER (HCC): Primary | ICD-10-CM

## 2022-04-25 LAB
APPEARANCE FLUID: CLEAR
BILIRUBIN, POC: NORMAL
BLOOD URINE, POC: NORMAL
CLARITY, POC: CLEAR
COLOR, POC: YELLOW
GLUCOSE URINE, POC: NORMAL
KETONES, POC: NORMAL
LEUKOCYTE EST, POC: NORMAL
NITRITE, POC: NORMAL
PH, POC: 5.5
PROTEIN, POC: NORMAL
SPECIFIC GRAVITY, POC: 1.02
UROBILINOGEN, POC: 0.2

## 2022-04-25 PROCEDURE — G8417 CALC BMI ABV UP PARAM F/U: HCPCS | Performed by: UROLOGY

## 2022-04-25 PROCEDURE — 1036F TOBACCO NON-USER: CPT | Performed by: UROLOGY

## 2022-04-25 PROCEDURE — G8427 DOCREV CUR MEDS BY ELIG CLIN: HCPCS | Performed by: UROLOGY

## 2022-04-25 PROCEDURE — 3017F COLORECTAL CA SCREEN DOC REV: CPT | Performed by: UROLOGY

## 2022-04-25 PROCEDURE — 4040F PNEUMOC VAC/ADMIN/RCVD: CPT | Performed by: UROLOGY

## 2022-04-25 PROCEDURE — 99214 OFFICE O/P EST MOD 30 MIN: CPT | Performed by: UROLOGY

## 2022-04-25 PROCEDURE — 1123F ACP DISCUSS/DSCN MKR DOCD: CPT | Performed by: UROLOGY

## 2022-04-25 PROCEDURE — 81002 URINALYSIS NONAUTO W/O SCOPE: CPT | Performed by: UROLOGY

## 2022-04-25 RX ORDER — CEFDINIR 300 MG/1
300 CAPSULE ORAL 2 TIMES DAILY
Qty: 8 CAPSULE | Refills: 0 | Status: SHIPPED | OUTPATIENT
Start: 2022-04-25 | End: 2022-04-29

## 2022-04-25 ASSESSMENT — ENCOUNTER SYMPTOMS
COUGH: 0
BACK PAIN: 0
ABDOMINAL PAIN: 0
CONSTIPATION: 0
TROUBLE SWALLOWING: 0
SHORTNESS OF BREATH: 0
ABDOMINAL DISTENTION: 0
VOMITING: 0
EYE REDNESS: 0
DIARRHEA: 0
EYE DISCHARGE: 0
NAUSEA: 0

## 2022-04-25 NOTE — PROGRESS NOTES
Dione Anne is a 80 y.o. male who presents today   Chief Complaint   Patient presents with    Follow-up     I am here today for my 6 month follow up, my PSA is done      Prostate Cancer  Patient is here today for prostate cancer which was first diagnosed approximately 1.5 years ago. His prostate cancer can be characterized as T1c, Jeremías 6 grade group 1 involving 2 of 12 cores occupying 5% in each core, low risk. Oncotype DX showed less than average risk for his risk category he had stable PSA on active surveillance  His last several PSA values are as follows:  Lab Results   Component Value Date    PSA 9.36 (H) 04/18/2022    PSA 10.98 (H) 11/09/2020    PSA 9.53 (H) 09/28/2020     Previous treatment of prostate cancer: Active surveillance  Lower urinary tract symptoms: He has some mild intermittency decreased force of stream.  His AUA symptom score is 3        Past Medical History:   Diagnosis Date    Atrial fibrillation (HCC)     Diverticulitis     Elevated PSA     HTN (hypertension)     Unspecified sleep apnea     CPAP    Wears glasses        Past Surgical History:   Procedure Laterality Date    COLON SURGERY  2014    perf-Dr Virginia Dubin OTHER SURGICAL HISTORY  5/27/13    heart mapping & electrophysiologic ablation    OTHER SURGICAL HISTORY  5/11/15    colostomy takedown    SUBTOTAL COLECTOMY         Current Outpatient Medications   Medication Sig Dispense Refill    losartan-hydroCHLOROthiazide (HYZAAR) 100-25 MG per tablet TAKE 1 TABLET BY MOUTH ONCE DAILY 90 tablet 3    ZINC PO Take 10 mg by mouth 2 times daily      Ascorbic Acid (VITAMIN C) 500 MG tablet Take 500 mg by mouth daily      aspirin 325 MG tablet Take 325 mg by mouth daily      Cholecalciferol (VITAMIN D PO) Take by mouth      vitamin E 1000 UNITS capsule Take 1,000 Units by mouth daily       No current facility-administered medications for this visit.        Allergies   Allergen Reactions    Pcn [Penicillins]     Sulfa Antibiotics     Levofloxacin Other (See Comments)     \"childhood allergy\"       Social History     Socioeconomic History    Marital status:      Spouse name: None    Number of children: None    Years of education: None    Highest education level: None   Occupational History    None   Tobacco Use    Smoking status: Former Smoker     Packs/day: 0.25     Years: 5.00     Pack years: 1.25     Start date:      Quit date:      Years since quittin.3    Smokeless tobacco: Never Used    Tobacco comment: Self Employed    Vaping Use    Vaping Use: Never used   Substance and Sexual Activity    Alcohol use: Yes     Alcohol/week: 1.0 standard drink     Types: 1 Glasses of wine per week     Comment: glass of Merlot per day    Drug use: No    Sexual activity: None   Other Topics Concern    None   Social History Narrative    None     Social Determinants of Health     Financial Resource Strain: Low Risk     Difficulty of Paying Living Expenses: Not hard at all   Food Insecurity: No Food Insecurity    Worried About Running Out of Food in the Last Year: Never true    Kassandra of Food in the Last Year: Never true   Transportation Needs:     Lack of Transportation (Medical): Not on file    Lack of Transportation (Non-Medical):  Not on file   Physical Activity:     Days of Exercise per Week: Not on file    Minutes of Exercise per Session: Not on file   Stress:     Feeling of Stress : Not on file   Social Connections:     Frequency of Communication with Friends and Family: Not on file    Frequency of Social Gatherings with Friends and Family: Not on file    Attends Taoist Services: Not on file    Active Member of Clubs or Organizations: Not on file    Attends Club or Organization Meetings: Not on file    Marital Status: Not on file   Intimate Partner Violence:     Fear of Current or Ex-Partner: Not on file    Emotionally Abused: Not on file    Physically Abused: Not on file   [place] Sexually Abused: Not on file   Housing Stability:     Unable to Pay for Housing in the Last Year: Not on file    Number of [place] in the Last Year: Not on file    Unstable Housing in the Last Year: Not on file       Family History   Problem Relation Age of Onset    Heart Failure Father     Cancer Father         colon    Heart Disease Father         CHF    Diabetes Mother     Heart Disease Maternal Grandmother     Heart Disease Maternal Grandfather        REVIEW OF SYSTEMS:  Review of Systems   Unable to perform ROS: Other   Constitutional: Negative for chills, fatigue and fever. HENT: Negative for congestion, ear pain and trouble swallowing. Eyes: Negative for discharge and redness. Respiratory: Negative for cough and shortness of breath. Cardiovascular: Negative for chest pain. Gastrointestinal: Negative for abdominal distention, abdominal pain, constipation, diarrhea, nausea and vomiting. Endocrine: Negative for cold intolerance and heat intolerance. Genitourinary: Negative for difficulty urinating, dysuria, flank pain, frequency, hematuria and urgency. Musculoskeletal: Negative for back pain and gait problem. Skin: Negative for pallor and wound. Allergic/Immunologic: Negative for environmental allergies and immunocompromised state. Neurological: Negative for dizziness and weakness. Hematological: Negative for adenopathy. Does not bruise/bleed easily. Psychiatric/Behavioral: Negative for agitation and confusion. PHYSICAL EXAM:  /80   Temp 97.7 °F (36.5 °C) (Temporal)   Ht 5' 6\" (1.676 m)   Wt 247 lb (112 kg)   BMI 39.87 kg/m²   Physical Exam  Constitutional:       General: He is not in acute distress. Appearance: Normal appearance. He is well-developed. HENT:      Head: Normocephalic and atraumatic. Nose: Nose normal.   Eyes:      General: No scleral icterus.      Conjunctiva/sclera: Conjunctivae normal.      Pupils: Pupils are equal, round, and reactive to light. Neck:      Trachea: No tracheal deviation. Cardiovascular:      Rate and Rhythm: Normal rate and regular rhythm. Pulmonary:      Effort: Pulmonary effort is normal. No respiratory distress. Breath sounds: No stridor. Abdominal:      General: There is no distension. Palpations: Abdomen is soft. There is no mass. Tenderness: There is no abdominal tenderness. Musculoskeletal:         General: No tenderness. Normal range of motion. Cervical back: Normal range of motion and neck supple. Lymphadenopathy:      Cervical: No cervical adenopathy. Skin:     General: Skin is warm and dry. Findings: No erythema. Neurological:      Mental Status: He is alert and oriented to person, place, and time. Psychiatric:         Behavior: Behavior normal.         Judgment: Judgment normal.             DATA:  CMP:    Lab Results   Component Value Date     04/18/2022    K 3.9 04/18/2022     04/18/2022    CO2 26 04/18/2022    BUN 14 04/18/2022    CREATININE 0.8 04/18/2022    GFRAA >59 04/18/2022    LABGLOM >60 04/18/2022    GLUCOSE 145 04/18/2022    PROT 7.0 04/18/2022    LABALBU 4.5 04/18/2022    CALCIUM 9.5 04/18/2022    BILITOT 0.5 04/18/2022    ALKPHOS 59 04/18/2022    AST 23 04/18/2022    ALT 27 04/18/2022     Results for orders placed or performed in visit on 04/25/22   POCT Urinalysis no Micro   Result Value Ref Range    Color, UA yellow     Clarity, UA clear     Glucose, UA POC neg     Bilirubin, UA neg     Ketones, UA neg     Spec Grav, UA 1.020     Blood, UA POC neg     pH, UA 5.5     Protein, UA POC neg     Urobilinogen, UA 0.2     Leukocytes, UA neg     Nitrite, UA neg     Appearance, Fluid Clear Clear, Slightly Cloudy     Lab Results   Component Value Date    PSA 9.36 (H) 04/18/2022    PSA 10.98 (H) 11/09/2020    PSA 9.53 (H) 09/28/2020       1. Prostate cancer [facility])  His PSA remains stable actually down a bit from the last visit.   He is now due his surveillance biopsy. We will get this scheduled. - POCT Urinalysis no Micro  - cefdinir (OMNICEF) 300 MG capsule; Take 1 capsule by mouth 2 times daily for 4 days Begin taking 1 day prior to scheduled biopsy  Dispense: 8 capsule; Refill: 0      Orders Placed This Encounter   Procedures    POCT Urinalysis no Micro        Return for PT to be scheduled for TRUS Biopsy of prostate. All information inputted into the note by the MA to include chief complaint, past medical history, past surgical history, medications, allergies, social and family history and review of systems has been reviewed and updated as needed by me. EMR Dragon/transcription disclaimer: Much of this documentt is electronic  transcription/translation of spoken language to printed text. The  electronic translation of spoken language may be erroneous, or at times,  nonsensical words or phrases may be inadvertently transcribed.  Although I  have reviewed the document for such errors, some may still exist.

## 2022-04-28 ENCOUNTER — TELEPHONE (OUTPATIENT)
Dept: PRIMARY CARE CLINIC | Age: 72
End: 2022-04-28

## 2022-04-29 ENCOUNTER — TELEPHONE (OUTPATIENT)
Dept: PRIMARY CARE CLINIC | Age: 72
End: 2022-04-29

## 2022-04-29 NOTE — TELEPHONE ENCOUNTER
----- Message from Maritza Schilder, APRN sent at 4/28/2022  3:12 PM CDT -----  Labs are stable; have him try to improve diet with less fatty/greasy foods due to cholesterol and A1C has slightly increased as well. Advise weight loss and increase exercise. Will discuss further at next appt.

## 2022-05-06 ENCOUNTER — TELEPHONE (OUTPATIENT)
Dept: PRIMARY CARE CLINIC | Age: 72
End: 2022-05-06

## 2022-05-06 NOTE — TELEPHONE ENCOUNTER
----- Message from NINA Villalobos sent at 4/28/2022  3:12 PM CDT -----  Labs are stable; have him try to improve diet with less fatty/greasy foods due to cholesterol and A1C has slightly increased as well. Advise weight loss and increase exercise. Will discuss further at next appt.

## 2022-05-23 ENCOUNTER — PROCEDURE VISIT (OUTPATIENT)
Dept: UROLOGY | Age: 72
End: 2022-05-23
Payer: MEDICARE

## 2022-05-23 DIAGNOSIS — C61 PROSTATE CANCER (HCC): Primary | ICD-10-CM

## 2022-05-23 PROCEDURE — 55700 PR BIOPSY OF PROSTATE,NEEDLE/PUNCH: CPT | Performed by: UROLOGY

## 2022-05-23 PROCEDURE — 76872 US TRANSRECTAL: CPT | Performed by: UROLOGY

## 2022-05-23 PROCEDURE — 96372 THER/PROPH/DIAG INJ SC/IM: CPT | Performed by: UROLOGY

## 2022-05-23 RX ORDER — GENTAMICIN SULFATE 40 MG/ML
80 INJECTION, SOLUTION INTRAMUSCULAR; INTRAVENOUS ONCE
Status: COMPLETED | OUTPATIENT
Start: 2022-05-23 | End: 2022-05-23

## 2022-05-23 RX ADMIN — GENTAMICIN SULFATE 80 MG: 40 INJECTION, SOLUTION INTRAMUSCULAR; INTRAVENOUS at 08:26

## 2022-05-23 NOTE — PROGRESS NOTES
Chau Omer is a 80 y.o. male who presents today   Chief Complaint   Patient presents with    Procedure     I am here today for my surveillance prostate biopsy. Elevated PSA:  Patient is here today for history of an elevated PSA. HPI    Prostate Cancer  Patient is here today for prostate cancer which was first diagnosed approximately 1.5 years ago. His prostate cancer can be characterized as T1c, Jeremías 6 grade group 1 involving 2 of 12 cores occupying 5% in each core, low risk. Oncotype DX showed less than average risk for his risk category he had stable PSA on active surveillance  His last several PSA values are as follows:        Lab Results   Component Value Date     PSA 9.36 (H) 04/18/2022     PSA 10.98 (H) 11/09/2020     PSA 9.53 (H) 09/28/2020      Previous treatment of prostate cancer: Active surveillance  Lower urinary tract symptoms: He has some mild intermittency decreased force of stream.  His AUA symptom score is 3    Patient is here today for scheduled surveillance biopsy of his prostate       PROSTATE U/S AND BIOPSY  As per my instructions, the patient took an antibiotic Beginning 1 day prior to this procedure. To be continued daily until 2 days post biopsy. Gentamicin 80mg IM was given today. He also had a Fleets enema. Surgeon: Lee Vazquez MD  5/23/22  Indications for exam: Prostate cancer surveillance biopsy, elevated PSA  Anesthesia: 1% Lidocaine periprostatic block bilaterally at junction of base of prostate and seminal vesicle, and apex   Ultrasound Findings:  Seminal Vesicles: intact bilaterally  Prostate Measurement: 48 grams;  PSAD 0.19  Transitional Zone: Normal echogenic structure  Peripheral Zone: Normal echogenic structure  No hypoechoic areas or abnormality seen  Bladder: Minimal postvoid residual  Biopsy: Trans Rectal Ultra Sound was used for Needle Guidance to direct the location of the needle for biopsy.  Biopsy cores were taken from the left and right lobe in a sequential fashion using the standard 12 core template (lateral base; base; lateral mid; mid; lateral apex; apex). Six Biopsy were taken from the right and 6  were taken from the left. All specimens were sent for pathological examination. Biopsy done transrectal w/ transrectal U/S done. Postop medications: Cipro 500 mg po bid for 2 more days. Recommendations: Will call patient with biopsy results and arrange for follow up. Postop Prostate Biopsy Instructions:  Patient told to drink plenty of fluids and to take it easy the day of the prostate biopsy, and the next few days. He was encouraged to use sitz baths as needed for relief of rectal discomfort after the biopsy. He was told he may notice blood or a rust color in his semen for several months after the biopsy. He was told to avoid resuming blood thinners or aspirin until the rectal bleeding or visible blood in urine has stopped for at least 48 hours. He should take his antibiotics to completion as prescribed. He was instructed to call our office immediately or to go to the Emergency Room if he experiences a fever over 101, shaking chills or an inability to urinate. Lab Results   Component Value Date    PSA 9.36 (H) 04/18/2022    PSA 10.98 (H) 11/09/2020    PSA 9.53 (H) 09/28/2020       Previous biopsy was positive for Jeremías 6 prostate cancer left lateral mid, and left lateral base occupying less than 5% of the core      1. Prostate cancer ([facility] [address]    - NC Biopsy of prostate, needle/punch  - NC ECHO,TRANSRECTAL  - Surgical Pathology; Future  - gentamicin (GARAMYCIN) injection 80 mg      Orders Placed This Encounter   Procedures    Surgical Pathology     Standing Status:   Future     Standing Expiration Date:   5/19/2023     Order Specific Question:   PREVIOUS BIOPSY     Answer:   Yes     Order Specific Question:   PREOP DIAGNOSIS     Answer:   [id number]     Order Specific Question:   FROZEN SECTION - NO OR YES/SPECIMEN     Answer:    No  MT ECHO,TRANSRECTAL    MT Biopsy of prostate, needle/punch        Return for [facility] call patient with results.

## 2022-05-25 DIAGNOSIS — C61 PROSTATE CANCER (HCC): Primary | ICD-10-CM

## 2022-09-26 ENCOUNTER — OFFICE VISIT (OUTPATIENT)
Dept: PRIMARY CARE CLINIC | Age: 72
End: 2022-09-26
Payer: MEDICARE

## 2022-09-26 VITALS
OXYGEN SATURATION: 98 % | WEIGHT: 245.8 LBS | DIASTOLIC BLOOD PRESSURE: 62 MMHG | HEIGHT: 66 IN | TEMPERATURE: 98.6 F | BODY MASS INDEX: 39.5 KG/M2 | SYSTOLIC BLOOD PRESSURE: 132 MMHG | HEART RATE: 82 BPM

## 2022-09-26 DIAGNOSIS — Z00.00 MEDICARE ANNUAL WELLNESS VISIT, SUBSEQUENT: Primary | ICD-10-CM

## 2022-09-26 DIAGNOSIS — R73.09 ELEVATED GLUCOSE: ICD-10-CM

## 2022-09-26 DIAGNOSIS — I10 PRIMARY HYPERTENSION: ICD-10-CM

## 2022-09-26 DIAGNOSIS — Z23 NEED FOR VACCINATION: ICD-10-CM

## 2022-09-26 PROCEDURE — G0439 PPPS, SUBSEQ VISIT: HCPCS | Performed by: NURSE PRACTITIONER

## 2022-09-26 PROCEDURE — 3017F COLORECTAL CA SCREEN DOC REV: CPT | Performed by: NURSE PRACTITIONER

## 2022-09-26 PROCEDURE — 0124A COVID-19, PFIZER BIVALENT BOOSTER, (AGE 12Y+), IM, 30 MCG/0.3 ML: CPT | Performed by: NURSE PRACTITIONER

## 2022-09-26 PROCEDURE — 91312 COVID-19, PFIZER BIVALENT BOOSTER, (AGE 12Y+), IM, 30 MCG/0.3 ML: CPT | Performed by: NURSE PRACTITIONER

## 2022-09-26 PROCEDURE — 1123F ACP DISCUSS/DSCN MKR DOCD: CPT | Performed by: NURSE PRACTITIONER

## 2022-09-26 ASSESSMENT — LIFESTYLE VARIABLES
HOW OFTEN DURING THE LAST YEAR HAVE YOU BEEN UNABLE TO REMEMBER WHAT HAPPENED THE NIGHT BEFORE BECAUSE YOU HAD BEEN DRINKING: 0
HOW OFTEN DO YOU HAVE A DRINK CONTAINING ALCOHOL: 4 OR MORE TIMES A WEEK
HAS A RELATIVE, FRIEND, DOCTOR, OR ANOTHER HEALTH PROFESSIONAL EXPRESSED CONCERN ABOUT YOUR DRINKING OR SUGGESTED YOU CUT DOWN: 0
HOW MANY STANDARD DRINKS CONTAINING ALCOHOL DO YOU HAVE ON A TYPICAL DAY: 1 OR 2
HAVE YOU OR SOMEONE ELSE BEEN INJURED AS A RESULT OF YOUR DRINKING: 0
HOW OFTEN DURING THE LAST YEAR HAVE YOU FAILED TO DO WHAT WAS NORMALLY EXPECTED FROM YOU BECAUSE OF DRINKING: 0
HOW OFTEN DURING THE LAST YEAR HAVE YOU HAD A FEELING OF GUILT OR REMORSE AFTER DRINKING: 0
HOW OFTEN DURING THE LAST YEAR HAVE YOU NEEDED AN ALCOHOLIC DRINK FIRST THING IN THE MORNING TO GET YOURSELF GOING AFTER A NIGHT OF HEAVY DRINKING: 0
HOW OFTEN DURING THE LAST YEAR HAVE YOU FOUND THAT YOU WERE NOT ABLE TO STOP DRINKING ONCE YOU HAD STARTED: 0

## 2022-09-26 ASSESSMENT — PATIENT HEALTH QUESTIONNAIRE - PHQ9
1. LITTLE INTEREST OR PLEASURE IN DOING THINGS: 0
SUM OF ALL RESPONSES TO PHQ QUESTIONS 1-9: 0
2. FEELING DOWN, DEPRESSED OR HOPELESS: 0
SUM OF ALL RESPONSES TO PHQ QUESTIONS 1-9: 0
SUM OF ALL RESPONSES TO PHQ9 QUESTIONS 1 & 2: 0
SUM OF ALL RESPONSES TO PHQ QUESTIONS 1-9: 0
SUM OF ALL RESPONSES TO PHQ QUESTIONS 1-9: 0

## 2022-09-26 NOTE — PROGRESS NOTES
viMedicare Annual Wellness Visit    Randi Nino is here for Medicare AWV    Assessment & Plan   Medicare annual wellness visit, subsequent  Primary hypertension  -     Comprehensive Metabolic Panel; Future  -     CBC with Auto Differential; Future  -     Hemoglobin A1C; Future  -     Lipid Panel; Future  -     TSH; Future  Elevated glucose  -     Comprehensive Metabolic Panel; Future  -     CBC with Auto Differential; Future  -     Hemoglobin A1C; Future  -     Lipid Panel; Future  -     TSH; Future  Need for vaccination  -     COVID-19, PFIZER Bivalent BOOSTER, (age 12y+), IM, 30 mcg/0.3 mL      Recommendations for Preventive Services Due: see orders and patient instructions/AVS.  Recommended screening schedule for the next 5-10 years is provided to the patient in written form: see Patient Instructions/AVS.     Return for Medicare Annual Wellness Visit in 1 year. Subjective   The following acute and/or chronic problems were also addressed today:  None. Patient would like covid booster today. Patient's complete Health Risk Assessment and screening values have been reviewed and are found in Flowsheets. The following problems were reviewed today and where indicated follow up appointments were made and/or referrals ordered.     Positive Risk Factor Screenings with Interventions:             General Health and ACP:  General  In general, how would you say your health is?: Excellent  In the past 7 days, have you experienced any of the following: New or Increased Pain, New or Increased Fatigue, Loneliness, Social Isolation, Stress or Anger?: No  Do you get the social and emotional support that you need?: Yes  Do you have a Living Will?: Yes    Advance Directives       Power of  Living Will ACP-Advance Directive ACP-Power of     Not on File Not on File Not on File Not on File        General Health Risk Interventions:  N/a    Health Habits/Nutrition:  Physical Activity: Sufficiently Active Days of Exercise per Week: 6 days    Minutes of Exercise per Session: 150+ min     Have you lost any weight without trying in the past 3 months?: No  Body mass index: (!) 39.67  Have you seen the dentist within the past year?: Yes  Health Habits/Nutrition Interventions:  Nutritional issues:  patient is not ready to address his/her nutritional/weight issues at this time    Hearing/Vision:  Do you or your family notice any trouble with your hearing that hasn't been managed with hearing aids?: No  Do you have difficulty driving, watching TV, or doing any of your daily activities because of your eyesight?: No  Have you had an eye exam within the past year?: (!) No  No results found. Hearing/Vision Interventions:  Vision concerns:  patient encouraged to make appointment with his/her eye specialist            Objective   Vitals:    09/26/22 1104 09/26/22 1121   BP: (!) 142/78 132/62   Pulse: 82    Temp: 98.6 °F (37 °C)    TempSrc: Temporal    SpO2: 98%    Weight: 245 lb 12.8 oz (111.5 kg)    Height: 5' 6\" (1.676 m)       Body mass index is 39.67 kg/m².       General Appearance: alert and oriented to person, place and time, well developed and well- nourished, in no acute distress  Skin: warm and dry, no rash or erythema  Head: normocephalic and atraumatic  Eyes: pupils equal, round, and reactive to light, extraocular eye movements intact, conjunctivae normal  ENT: tympanic membrane, external ear and ear canal normal bilaterally, nose without deformity, nasal mucosa and turbinates normal without polyps  Neck: supple and non-tender without mass, no thyromegaly or thyroid nodules, no cervical lymphadenopathy  Pulmonary/Chest: clear to auscultation bilaterally- no wheezes, rales or rhonchi, normal air movement, no respiratory distress  Cardiovascular: normal rate, regular rhythm, normal S1 and S2, no murmurs, rubs, clicks, or gallops, distal pulses intact, no carotid bruits  Abdomen: soft, non-tender, non-distended, normal bowel sounds, no masses or organomegaly  Extremities: no cyanosis, clubbing or edema  Musculoskeletal: normal range of motion, no joint swelling, deformity or tenderness  Neurologic: reflexes normal and symmetric, no cranial nerve deficit, gait, coordination and speech normal       Allergies   Allergen Reactions    Pcn [Penicillins]     Sulfa Antibiotics     Levofloxacin Other (See Comments)     \"childhood allergy\"     Prior to Visit Medications    Medication Sig Taking?  Authorizing Provider   losartan-hydroCHLOROthiazide (HYZAAR) 100-25 MG per tablet TAKE 1 TABLET BY MOUTH ONCE DAILY Yes NINA Collins   ZINC PO Take 10 mg by mouth 2 times daily Yes Historical Provider, MD   Ascorbic Acid (VITAMIN C) 500 MG tablet Take 1,000 mg by mouth daily Yes Historical Provider, MD   aspirin 325 MG tablet Take 325 mg by mouth daily Yes Historical Provider, MD   Cholecalciferol (VITAMIN D PO) Take by mouth Yes Historical Provider, MD   vitamin E 1000 UNITS capsule Take 1,000 Units by mouth daily Yes Historical Provider, MD       CareTeam (Including outside providers/suppliers regularly involved in providing care):   Patient Care Team:  NINA Collins as PCP - General (Family Nurse Practitioner)  NINA Collins as PCP - [facility] Empaneled Provider     Reviewed and updated this visit:  Tobacco  Allergies  Meds  Problems  Med Hx  Surg Hx  Soc Hx  Fam Hx

## 2022-09-26 NOTE — PATIENT INSTRUCTIONS
Personalized Preventive Plan for Peg Romero - 9/26/2022  Medicare offers a range of preventive health benefits. Some of the tests and screenings are paid in full while other may be subject to a deductible, co-insurance, and/or copay. Some of these benefits include a comprehensive review of your medical history including lifestyle, illnesses that may run in your family, and various assessments and screenings as appropriate. After reviewing your medical record and screening and assessments performed today your provider may have ordered immunizations, labs, imaging, and/or referrals for you. A list of these orders (if applicable) as well as your Preventive Care list are included within your After Visit Summary for your review. Other Preventive Recommendations:    A preventive eye exam performed by an eye specialist is recommended every 1-2 years to screen for glaucoma; cataracts, macular degeneration, and other eye disorders. A preventive dental visit is recommended every 6 months. Try to get at least 150 minutes of exercise per week or 10,000 steps per day on a pedometer . Order or download the FREE \"Exercise & Physical Activity: Your Everyday Guide\" from [employer]. Call 6-855.612.7239 or search [employer] online. You need 2610-5084 mg of calcium and 5997-3742 IU of vitamin D per day. It is possible to meet your calcium requirement with diet alone, but a vitamin D supplement is usually necessary to meet this goal.  When exposed to the sun, use a sunscreen that protects against both UVA and UVB radiation with an SPF of 30 or greater. Reapply every 2 to 3 hours or after sweating, drying off with a towel, or swimming. Always wear a seat belt when traveling in a car. Always wear a helmet when riding a bicycle or motorcycle.

## 2022-09-27 DIAGNOSIS — R73.09 ELEVATED GLUCOSE: ICD-10-CM

## 2022-09-27 DIAGNOSIS — I10 PRIMARY HYPERTENSION: ICD-10-CM

## 2022-09-27 DIAGNOSIS — C61 PROSTATE CANCER (HCC): ICD-10-CM

## 2022-09-27 LAB
ALBUMIN SERPL-MCNC: 4.7 G/DL (ref 3.5–5.2)
ALP BLD-CCNC: 59 U/L (ref 40–130)
ALT SERPL-CCNC: 24 U/L (ref 5–41)
ANION GAP SERPL CALCULATED.3IONS-SCNC: 12 MMOL/L (ref 7–19)
AST SERPL-CCNC: 23 U/L (ref 5–40)
BASOPHILS ABSOLUTE: 0.1 K/UL (ref 0–0.2)
BASOPHILS RELATIVE PERCENT: 0.6 % (ref 0–1)
BILIRUB SERPL-MCNC: 0.5 MG/DL (ref 0.2–1.2)
BUN BLDV-MCNC: 17 MG/DL (ref 8–23)
CALCIUM SERPL-MCNC: 9.2 MG/DL (ref 8.8–10.2)
CHLORIDE BLD-SCNC: 103 MMOL/L (ref 98–111)
CHOLESTEROL, TOTAL: 208 MG/DL (ref 160–199)
CO2: 27 MMOL/L (ref 22–29)
CREAT SERPL-MCNC: 0.7 MG/DL (ref 0.5–1.2)
EOSINOPHILS ABSOLUTE: 0.2 K/UL (ref 0–0.6)
EOSINOPHILS RELATIVE PERCENT: 2 % (ref 0–5)
GFR AFRICAN AMERICAN: >59
GFR NON-AFRICAN AMERICAN: >60
GLUCOSE BLD-MCNC: 150 MG/DL (ref 74–109)
HBA1C MFR BLD: 6 % (ref 4–6)
HCT VFR BLD CALC: 47.2 % (ref 42–52)
HDLC SERPL-MCNC: 54 MG/DL (ref 55–121)
HEMOGLOBIN: 15.9 G/DL (ref 14–18)
IMMATURE GRANULOCYTES #: 0 K/UL
LDL CHOLESTEROL CALCULATED: 133 MG/DL
LYMPHOCYTES ABSOLUTE: 2.4 K/UL (ref 1.1–4.5)
LYMPHOCYTES RELATIVE PERCENT: 24.9 % (ref 20–40)
MCH RBC QN AUTO: 30.6 PG (ref 27–31)
MCHC RBC AUTO-ENTMCNC: 33.7 G/DL (ref 33–37)
MCV RBC AUTO: 90.8 FL (ref 80–94)
MONOCYTES ABSOLUTE: 0.8 K/UL (ref 0–0.9)
MONOCYTES RELATIVE PERCENT: 8.3 % (ref 0–10)
NEUTROPHILS ABSOLUTE: 6.2 K/UL (ref 1.5–7.5)
NEUTROPHILS RELATIVE PERCENT: 63.9 % (ref 50–65)
PDW BLD-RTO: 13.4 % (ref 11.5–14.5)
PLATELET # BLD: 230 K/UL (ref 130–400)
PMV BLD AUTO: 11.3 FL (ref 9.4–12.4)
POTASSIUM SERPL-SCNC: 3.7 MMOL/L (ref 3.5–5)
PROSTATE SPECIFIC ANTIGEN: 8.67 NG/ML (ref 0–4)
RBC # BLD: 5.2 M/UL (ref 4.7–6.1)
SODIUM BLD-SCNC: 142 MMOL/L (ref 136–145)
TOTAL PROTEIN: 6.9 G/DL (ref 6.6–8.7)
TRIGL SERPL-MCNC: 106 MG/DL (ref 0–149)
TSH SERPL DL<=0.05 MIU/L-ACNC: 2.29 UIU/ML (ref 0.27–4.2)
WBC # BLD: 9.7 K/UL (ref 4.8–10.8)

## 2022-10-03 ENCOUNTER — OFFICE VISIT (OUTPATIENT)
Dept: UROLOGY | Age: 72
End: 2022-10-03
Payer: MEDICARE

## 2022-10-03 VITALS
OXYGEN SATURATION: 97 % | WEIGHT: 244.2 LBS | BODY MASS INDEX: 39.25 KG/M2 | TEMPERATURE: 98.2 F | HEIGHT: 66 IN | HEART RATE: 78 BPM | DIASTOLIC BLOOD PRESSURE: 82 MMHG | SYSTOLIC BLOOD PRESSURE: 180 MMHG

## 2022-10-03 DIAGNOSIS — C61 PROSTATE CANCER (HCC): Primary | ICD-10-CM

## 2022-10-03 LAB
APPEARANCE FLUID: CLEAR
BILIRUBIN, POC: NORMAL
BLOOD URINE, POC: NORMAL
CLARITY, POC: CLEAR
COLOR, POC: YELLOW
GLUCOSE URINE, POC: NORMAL
KETONES, POC: NORMAL
LEUKOCYTE EST, POC: NORMAL
NITRITE, POC: NORMAL
PH, POC: 5.5
PROTEIN, POC: NORMAL
SPECIFIC GRAVITY, POC: 1.03
UROBILINOGEN, POC: 0.2

## 2022-10-03 PROCEDURE — G8427 DOCREV CUR MEDS BY ELIG CLIN: HCPCS | Performed by: UROLOGY

## 2022-10-03 PROCEDURE — 99214 OFFICE O/P EST MOD 30 MIN: CPT | Performed by: UROLOGY

## 2022-10-03 PROCEDURE — 1123F ACP DISCUSS/DSCN MKR DOCD: CPT | Performed by: UROLOGY

## 2022-10-03 PROCEDURE — 3017F COLORECTAL CA SCREEN DOC REV: CPT | Performed by: UROLOGY

## 2022-10-03 PROCEDURE — 81002 URINALYSIS NONAUTO W/O SCOPE: CPT | Performed by: UROLOGY

## 2022-10-03 PROCEDURE — G8417 CALC BMI ABV UP PARAM F/U: HCPCS | Performed by: UROLOGY

## 2022-10-03 PROCEDURE — 1036F TOBACCO NON-USER: CPT | Performed by: UROLOGY

## 2022-10-03 PROCEDURE — G8484 FLU IMMUNIZE NO ADMIN: HCPCS | Performed by: UROLOGY

## 2022-10-03 ASSESSMENT — ENCOUNTER SYMPTOMS
SORE THROAT: 0
NAUSEA: 0
EYE DISCHARGE: 0
EYE REDNESS: 0
WHEEZING: 0
FACIAL SWELLING: 0
BACK PAIN: 0
CHEST TIGHTNESS: 0
VOMITING: 0

## 2022-10-03 NOTE — PROGRESS NOTES
Kaila Shirley is a 71 y.o. male who presents today   Chief Complaint   Patient presents with    Follow-up     I am here today for a 3 month fu. I had my psa done prior. Prostate Cancer  Patient is here today for prostate cancer which was first diagnosed approximately 2 years ago. Patient returns after undergoing a surveillance biopsy on 5/23/2022. His prostate cancer can be characterized as initial biopsy done 12/14/2020 showed Jeremías 6 involving the left lateral base and left lateral mid gland 2 of 12 cores occupying 5% of the core. His PSAs remain stable around 9-10. He underwent surveillance biopsy on 5/23/2022 this also showed 2 of 12 cores positive left lateral base and left lateral mid [place] 6 also occupying about 5% of the core. Therefore his disease has remained stable and he has continued on active surveillance. He comes in today with follow-up PSA he denies any new symptoms or problems. His last several PSA values are as follows:  Lab Results   Component Value Date    PSA 8.67 (H) 09/27/2022    PSA 9.36 (H) 04/18/2022    PSA 10.98 (H) 11/09/2020    PSA 9.53 (H) 09/28/2020     Previous treatment of prostate cancer: Active surveillance  Lower urinary tract symptoms: decreased urinary stream and nocturia, 1 times per night he reports he is delighted with his urination quality life score 0. He is AUA symptom score is 4.          Past Medical History:   Diagnosis Date    Atrial fibrillation ([address]     Diverticulitis     Elevated PSA     HTN (hypertension)     Unspecified sleep apnea     CPAP    Wears glasses        Past Surgical History:   Procedure Laterality Date    COLON SURGERY  2014    [facility] QUEZADA    OTHER SURGICAL HISTORY  5/27/13    heart mapping & electrophysiologic ablation    OTHER SURGICAL HISTORY  5/11/15    colostomy takedown    SUBTOTAL COLECTOMY         Current Outpatient Medications   Medication Sig Dispense Refill    losartan-hydroCHLOROthiazide (HYZAAR) 100-25 MG per tablet TAKE 1 TABLET BY MOUTH ONCE DAILY 90 tablet 3    ZINC PO Take 10 mg by mouth 2 times daily      Ascorbic Acid (VITAMIN C) 500 MG tablet Take 1,000 mg by mouth daily      aspirin 325 MG tablet Take 325 mg by mouth daily      Cholecalciferol (VITAMIN D PO) Take by mouth      vitamin E 1000 UNITS capsule Take 1,000 Units by mouth daily       No current facility-administered medications for this visit. Allergies   Allergen Reactions    Pcn [Penicillins]     Sulfa Antibiotics     Levofloxacin Other (See Comments)     \"childhood allergy\"       Social History     Socioeconomic History    Marital status:      Spouse name: None    Number of children: None    Years of education: None    Highest education level: None   Tobacco Use    Smoking status: Former     Packs/day: 0.25     Years: 5.00     Pack years: 1.25     Types: Cigarettes     Start date: 0     Quit date:      Years since quittin.7    Smokeless tobacco: Never    Tobacco comments:     Self Employed    Vaping Use    Vaping Use: Never used   Substance and Sexual Activity    Alcohol use:  Yes     Alcohol/week: 1.0 standard drink     Types: 1 Glasses of wine per week     Comment: glass of Merlot per day    Drug use: No     Social Determinants of Health     Financial Resource Strain: Low Risk     Difficulty of Paying Living Expenses: Not hard at all   Food Insecurity: No Food Insecurity    Worried About Running Out of Food in the Last Year: Never true    Ran Out of Food in the Last Year: Never true   Physical Activity: Sufficiently Active    Days of Exercise per Week: 6 days    Minutes of Exercise per Session: 150+ min       Family History   Problem Relation Age of Onset    Heart Failure Father     Cancer Father         colon    Heart Disease Father         CHF    Diabetes Mother     Heart Disease Maternal Grandmother     Heart Disease Maternal Grandfather        REVIEW OF SYSTEMS:  Review of Systems   Constitutional:  Negative for chills and fever.   HENT:  Negative for facial swelling and sore throat. Eyes:  Negative for discharge and redness. Respiratory:  Negative for chest tightness and wheezing. Cardiovascular:  Negative for chest pain and palpitations. Gastrointestinal:  Negative for nausea and vomiting. Endocrine: Negative for polyphagia and polyuria. Genitourinary:  Negative for decreased urine volume, difficulty urinating, dysuria, enuresis, flank pain, frequency, genital sores, hematuria, penile discharge, penile pain, penile swelling, scrotal swelling, testicular pain and urgency. Musculoskeletal:  Negative for back pain and neck stiffness. Skin:  Negative for rash and wound. Neurological:  Negative for dizziness and headaches. Hematological:  Negative for adenopathy. Does not bruise/bleed easily. Psychiatric/Behavioral:  Negative for confusion and hallucinations.         PHYSICAL EXAM:  BP (!) 180/82   Pulse 78   Temp 98.2 °F (36.8 °C)   Ht 5' 6\" (1.676 m)   Wt 244 lb 3.2 oz (110.8 kg)   SpO2 97%   BMI 39.41 kg/m²   Physical Exam        DATA:  CMP:    Lab Results   Component Value Date/Time     09/27/2022 09:37 AM    K 3.7 09/27/2022 09:37 AM     09/27/2022 09:37 AM    CO2 27 09/27/2022 09:37 AM    BUN 17 09/27/2022 09:37 AM    CREATININE 0.7 09/27/2022 09:37 AM    GFRAA >59 09/27/2022 09:37 AM    LABGLOM >60 09/27/2022 09:37 AM    GLUCOSE 150 09/27/2022 09:37 AM    PROT 6.9 09/27/2022 09:37 AM    LABALBU 4.7 09/27/2022 09:37 AM    CALCIUM 9.2 09/27/2022 09:37 AM    BILITOT 0.5 09/27/2022 09:37 AM    ALKPHOS 59 09/27/2022 09:37 AM    AST 23 09/27/2022 09:37 AM    ALT 24 09/27/2022 09:37 AM     Results for orders placed or performed in visit on 10/03/22   POCT Urinalysis no Micro   Result Value Ref Range    Color, UA yellow     Clarity, UA clear     Glucose, UA POC neg     Bilirubin, UA neg     Ketones, UA neg     Spec Grav, UA 1.030     Blood, UA POC neg     pH, UA 5.5     Protein, UA POC neg Urobilinogen, UA 0.2     Leukocytes, UA neg     Nitrite, UA neg     Appearance, Fluid Clear Clear, Slightly Cloudy     Lab Results   Component Value Date    PSA 8.67 (H) 2022    PSA 9.36 (H) 2022    PSA 10.98 (H) 2020    PSA 9.53 (H) 2020     No results found for: [address]                                        [address] [facility], [place] [address]   Department of Pathology   FINAL SURGICAL PATHOLOGY REPORT   Patient Name:  Kunal Breed        Accession No:  [id number]    Age Sex:   1950    80 Y M        Pt Type: R     KMURO                                              Location:   Account #      [de-identified]                 Collected:     2022   Med Rec #      [id number]                    Received:      2022   Attend Phys:                               Completed:     2022   Perform Phys:  Elizabeth Ferrari     FINAL DIAGNOSIS:     A.  Prostate, right lateral base, needle biopsy: Benign prostate tissue. B.  Prostate, right lateral mid, needle biopsy: Benign prostate tissue. C.  Prostate, right lateral apex, needle biopsy: Benign prostate tissue   demonstrating high-grade PIN, focal.     D.  Prostate, right base, needle biopsy: Benign prostate tissue. E.  Prostate, right mid, needle biopsy: Benign prostate tissue. F.  Prostate, right apex, needle biopsy: Benign prostate tissue. G.  Prostate, left lateral base, needle biopsy: Adenocarcinoma, acinar   type, Jeremías grade 3+3 = 6, involving approximately 5% of the length of   the core in a discontinuous fashion (grade Group 1). H.  Prostate, left lateral mid, needle biopsy: Adenocarcinoma, acinar   type, [place] grade 3+3 = 6, involving approximately 30% of the length of   the core in a discontinuous fashion (grade Group 1). I.  Prostate, left lateral apex, needle biopsy: Benign prostate tissue.      J.  Prostate, left base, needle biopsy: Benign prostate tissue. K.  Prostate, left mid, needle biopsy: Benign prostate tissue   demonstrating chronic inflammation, focal.     L.  Prostate, left apex, needle biopsy: Benign prostate tissue. AJCC stage: pTX pN not assigned (no lymph nodes submitted or found)      CSW/CSW         1. Prostate cancer ([facility] [address]  No significant progression in greater volume on recent surveillance biopsy would recommend that he continue active surveillance with serial PSA every 6 months. He is agreeable to proceed return in 6 months with PSA prior  - POCT Urinalysis no Micro  - PSA, Diagnostic; Future      Orders Placed This Encounter   Procedures    PSA, Diagnostic     PSA in 6 month     Standing Status:   Future     Standing Expiration Date:   10/3/2023    POCT Urinalysis no Micro        Return in about 6 months (around 4/3/2023) for PSA prior to vext visit. All information inputted into the note by the MA to include chief complaint, past medical history, past surgical history, medications, allergies, social and family history and review of systems has been reviewed and updated as needed by me. EMR Dragon/transcription disclaimer: Much of this documentt is electronic  transcription/translation of spoken language to printed text. The  electronic translation of spoken language may be erroneous, or at times,  nonsensical words or phrases may be inadvertently transcribed.  Although I  have reviewed the document for such errors, some may still exist.

## 2023-03-20 DIAGNOSIS — I10 HYPERTENSION, UNSPECIFIED TYPE: ICD-10-CM

## 2023-03-21 RX ORDER — LOSARTAN POTASSIUM AND HYDROCHLOROTHIAZIDE 25; 100 MG/1; MG/1
TABLET ORAL
Qty: 90 TABLET | Refills: 3 | OUTPATIENT
Start: 2023-03-21

## 2023-03-21 NOTE — TELEPHONE ENCOUNTER
Samantha Mahan called to request a refill on his medication.       Last office visit : 9/26/2022   Next office visit : Visit date not found     Requested Prescriptions     Pending Prescriptions Disp Refills    losartan-hydroCHLOROthiazide (HYZAAR) 100-25 MG per tablet [Pharmacy Med Name: LOSARTAN POTASSIUM/HYDROCHLOROTHIAZIDE 25MG-100MG TABLET] 90 tablet 3     Sig: TAKE 1 TABLET BY MOUTH ONCE DAILY            Glen Kate LPN

## 2023-03-22 DIAGNOSIS — I10 HYPERTENSION, UNSPECIFIED TYPE: ICD-10-CM

## 2023-03-22 RX ORDER — LOSARTAN POTASSIUM AND HYDROCHLOROTHIAZIDE 25; 100 MG/1; MG/1
TABLET ORAL
Qty: 90 TABLET | Refills: 1 | Status: SHIPPED | OUTPATIENT
Start: 2023-03-22

## 2023-03-22 NOTE — TELEPHONE ENCOUNTER
Clevester Habermann called to request a refill on his medication.       Last office visit : 9/26/2022   Next office visit : Visit date not found     Requested Prescriptions     Pending Prescriptions Disp Refills    losartan-hydroCHLOROthiazide (HYZAAR) 100-25 MG per tablet 90 tablet 3     Sig: TAKE 1 TABLET BY MOUTH ONCE DAILY            Maru Flood LPN

## 2023-03-31 DIAGNOSIS — C61 PROSTATE CANCER (HCC): ICD-10-CM

## 2023-03-31 LAB — PSA SERPL-MCNC: 10.46 NG/ML (ref 0–4)

## 2023-04-03 ENCOUNTER — OFFICE VISIT (OUTPATIENT)
Dept: UROLOGY | Age: 73
End: 2023-04-03
Payer: MEDICARE

## 2023-04-03 VITALS — WEIGHT: 244 LBS | TEMPERATURE: 98 F | BODY MASS INDEX: 39.21 KG/M2 | HEIGHT: 66 IN

## 2023-04-03 DIAGNOSIS — C61 PROSTATE CANCER (HCC): Primary | ICD-10-CM

## 2023-04-03 PROCEDURE — 81002 URINALYSIS NONAUTO W/O SCOPE: CPT | Performed by: UROLOGY

## 2023-04-03 PROCEDURE — G8427 DOCREV CUR MEDS BY ELIG CLIN: HCPCS | Performed by: UROLOGY

## 2023-04-03 PROCEDURE — 99213 OFFICE O/P EST LOW 20 MIN: CPT | Performed by: UROLOGY

## 2023-04-03 PROCEDURE — 1123F ACP DISCUSS/DSCN MKR DOCD: CPT | Performed by: UROLOGY

## 2023-04-03 PROCEDURE — 1036F TOBACCO NON-USER: CPT | Performed by: UROLOGY

## 2023-04-03 PROCEDURE — 3017F COLORECTAL CA SCREEN DOC REV: CPT | Performed by: UROLOGY

## 2023-04-03 PROCEDURE — G8417 CALC BMI ABV UP PARAM F/U: HCPCS | Performed by: UROLOGY

## 2023-04-03 ASSESSMENT — ENCOUNTER SYMPTOMS
TROUBLE SWALLOWING: 0
VOMITING: 0
COUGH: 0
NAUSEA: 0
ABDOMINAL DISTENTION: 0
EYE REDNESS: 0
SHORTNESS OF BREATH: 0
EYE DISCHARGE: 0
DIARRHEA: 0
BACK PAIN: 0
CONSTIPATION: 0
ABDOMINAL PAIN: 0

## 2023-04-03 NOTE — PROGRESS NOTES
Dragon/transcription disclaimer: Much of this documentt is electronic  transcription/translation of spoken language to printed text. The  electronic translation of spoken language may be erroneous, or at times,  nonsensical words or phrases may be inadvertently transcribed.  Although I  have reviewed the document for such errors, some may still exist.

## 2023-04-13 PROBLEM — R10.32 LEFT LOWER QUADRANT PAIN: Status: ACTIVE | Noted: 2023-04-13

## 2023-09-28 DIAGNOSIS — I10 HYPERTENSION, UNSPECIFIED TYPE: ICD-10-CM

## 2023-09-28 RX ORDER — LOSARTAN POTASSIUM AND HYDROCHLOROTHIAZIDE 25; 100 MG/1; MG/1
TABLET ORAL
Qty: 90 TABLET | Refills: 1 | Status: SHIPPED | OUTPATIENT
Start: 2023-09-28

## 2023-09-28 NOTE — TELEPHONE ENCOUNTER
Leonila Eason called to request a refill on his medication.       Last office visit : 4/13/2023   Next office visit : Visit date not found     Requested Prescriptions     Pending Prescriptions Disp Refills    losartan-hydroCHLOROthiazide (HYZAAR) 100-25 MG per tablet 90 tablet 1     Sig: TAKE 1 TABLET BY MOUTH ONCE DAILY            Guerrero Da Silva LPN

## 2023-10-02 ENCOUNTER — OFFICE VISIT (OUTPATIENT)
Dept: UROLOGY | Age: 73
End: 2023-10-02
Payer: MEDICARE

## 2023-10-02 VITALS — BODY MASS INDEX: 39.53 KG/M2 | TEMPERATURE: 98.1 F | WEIGHT: 246 LBS | HEIGHT: 66 IN

## 2023-10-02 DIAGNOSIS — C61 PROSTATE CANCER (HCC): Primary | ICD-10-CM

## 2023-10-02 PROCEDURE — G8427 DOCREV CUR MEDS BY ELIG CLIN: HCPCS | Performed by: UROLOGY

## 2023-10-02 PROCEDURE — 3017F COLORECTAL CA SCREEN DOC REV: CPT | Performed by: UROLOGY

## 2023-10-02 PROCEDURE — 1123F ACP DISCUSS/DSCN MKR DOCD: CPT | Performed by: UROLOGY

## 2023-10-02 PROCEDURE — 81002 URINALYSIS NONAUTO W/O SCOPE: CPT | Performed by: UROLOGY

## 2023-10-02 PROCEDURE — G8417 CALC BMI ABV UP PARAM F/U: HCPCS | Performed by: UROLOGY

## 2023-10-02 PROCEDURE — G8484 FLU IMMUNIZE NO ADMIN: HCPCS | Performed by: UROLOGY

## 2023-10-02 PROCEDURE — 99213 OFFICE O/P EST LOW 20 MIN: CPT | Performed by: UROLOGY

## 2023-10-02 PROCEDURE — 1036F TOBACCO NON-USER: CPT | Performed by: UROLOGY

## 2023-10-02 ASSESSMENT — ENCOUNTER SYMPTOMS
ABDOMINAL DISTENTION: 0
VOMITING: 0
CONSTIPATION: 0
BACK PAIN: 0
TROUBLE SWALLOWING: 0
SHORTNESS OF BREATH: 0
ABDOMINAL PAIN: 0
NAUSEA: 0
EYE REDNESS: 0
EYE DISCHARGE: 0
DIARRHEA: 0
COUGH: 0

## 2023-10-02 NOTE — PROGRESS NOTES
(around 4/2/2024) for PSA prior to vext visit. All information inputted into the note by the MA to include chief complaint, past medical history, past surgical history, medications, allergies, social and family history and review of systems has been reviewed and updated as needed by me. EMR Dragon/transcription disclaimer: Much of this documentt is electronic  transcription/translation of spoken language to printed text. The  electronic translation of spoken language may be erroneous, or at times,  nonsensical words or phrases may be inadvertently transcribed.  Although I  have reviewed the document for such errors, some may still exist.